# Patient Record
Sex: FEMALE | Race: WHITE | Employment: UNEMPLOYED | URBAN - METROPOLITAN AREA
[De-identification: names, ages, dates, MRNs, and addresses within clinical notes are randomized per-mention and may not be internally consistent; named-entity substitution may affect disease eponyms.]

---

## 2017-05-31 ENCOUNTER — TELEPHONE (OUTPATIENT)
Dept: ENT CLINIC | Age: 47
End: 2017-05-31

## 2017-06-16 ENCOUNTER — TELEPHONE (OUTPATIENT)
Dept: ENT CLINIC | Age: 47
End: 2017-06-16

## 2021-11-24 ENCOUNTER — HOSPITAL ENCOUNTER (EMERGENCY)
Age: 51
Discharge: HOME | End: 2021-11-24
Attending: EMERGENCY MEDICINE
Payer: COMMERCIAL

## 2021-11-24 VITALS
DIASTOLIC BLOOD PRESSURE: 60 MMHG | TEMPERATURE: 97.7 F | OXYGEN SATURATION: 99 % | HEART RATE: 90 BPM | SYSTOLIC BLOOD PRESSURE: 115 MMHG | RESPIRATION RATE: 18 BRPM

## 2021-11-24 DIAGNOSIS — R21 RASH AND OTHER NONSPECIFIC SKIN ERUPTION: Primary | ICD-10-CM

## 2021-11-24 DIAGNOSIS — B37.0 THRUSH, ORAL: ICD-10-CM

## 2021-11-24 PROCEDURE — 99284 EMERGENCY DEPT VISIT MOD MDM: CPT

## 2021-11-24 PROCEDURE — 6370000000 HC RX 637 (ALT 250 FOR IP): Performed by: EMERGENCY MEDICINE

## 2021-11-24 RX ORDER — HYDROXYZINE PAMOATE 25 MG/1
50 CAPSULE ORAL 3 TIMES DAILY PRN
Status: DISCONTINUED | OUTPATIENT
Start: 2021-11-24 | End: 2021-11-24 | Stop reason: HOSPADM

## 2021-11-24 RX ORDER — HYDROXYZINE PAMOATE 25 MG/1
50 CAPSULE ORAL 3 TIMES DAILY PRN
Qty: 30 CAPSULE | Refills: 0 | Status: SHIPPED | OUTPATIENT
Start: 2021-11-24 | End: 2021-11-29

## 2021-11-24 RX ADMIN — HYDROXYZINE PAMOATE 50 MG: 25 CAPSULE ORAL at 19:42

## 2021-11-24 RX ADMIN — NYSTATIN 500000 UNITS: 100000 SUSPENSION ORAL at 19:45

## 2021-11-25 NOTE — ED PROVIDER NOTES
[facility] 71 ENCOUNTER          ATTENDING PHYSICIAN NOTE       Date of evaluation: 11/24/2021    Chief Complaint     Rash (pt had knee replacement surgery last Thursday, states she thinks she is having an allergic reaction to something, she states that she had a rash all bret her body since the day she left the hospital. pt states her daughter thinks it is scabies, states her boyfriend thinks it is from her percocets.)      History of Present Illness     Ran Smith is a 45 y.o. female who presents with complaint of itching and rash for the last 6 days. She says that it started after she had a knee replacement last week, and almost immediately afterward felt itching on her skin. She complains of itching on her abdomen, upper chest, and on the back. Nothing on the webspaces of her hands. She does have some of the lower legs. None in the intertriginous areas. She notes that the excoriated areas are tender once they become excoriated, but before that are just itchy. She also complains of development of thrush which she says is common for her when she uses inhalers or antibiotics. She describes a raw feeling to her tongue and mouth. She has a picture of discharge on her tongue denies shortness of breath or chest pain. Says she has not taken anything for either the thrush or for the itchiness. Review of Systems     Review of Systems  Pertinent positives and negatives are listed in the HPI; otherwise all systems are reviewed and were negative. Past Medical, Surgical, Family, and Social History     She has a past medical history of Solares's esophageal ulceration. She has a past surgical history that includes Hysterectomy; Tubal ligation; and Tonsillectomy. Her family history is not on file. She reports that she has been smoking cigarettes. She has a 12.50 pack-year smoking history.  She has never used smokeless tobacco. She reports that she does not drink alcohol and does not use drugs. Medications     Discharge Medication List as of 11/24/2021  7:46 PM      CONTINUE these medications which have NOT CHANGED    Details   ziprasidone (GEODON) 20 MG capsule Take 20 mg by mouth daily      omeprazole (PRILOSEC) 20 MG capsule Take 40 mg by mouth daily             Allergies     She is allergic to penicillins and sulfa antibiotics. Physical Exam     INITIAL VITALS: BP: 115/65, Temp: 97.7 °F (36.5 °C), Pulse: 93, Resp: 19, SpO2: 97 %   Physical Exam  Constitutional:       Appearance: Normal appearance. HENT:      Head: Normocephalic and atraumatic. Mouth/Throat:      Mouth: Mucous membranes are moist.      Comments: Erythema of tongue with some associated tenderness and no swelling. Posterior pharynx mildly erythematous. Cardiovascular:      Rate and Rhythm: Normal rate and regular rhythm. Musculoskeletal:         General: No swelling. Skin:     Comments: Multiple small discrete excoriated areas on the skin of the abdomen and exposed areas of upper chest.  A couple of other similar lesions are noted on the back. , without obvious erythematous lesions otherwise. No sign of secondary infection. No nonexcoriated lesions are noted. No lesions on the hands soles, no webspace lesions   Neurological:      General: No focal deficit present. Mental Status: She is alert and oriented to person, place, and time. Diagnostic Results     EKG       RADIOLOGY:  No orders to display       LABS:   No results found for this visit on 11/24/21. ED BEDSIDE ULTRASOUND:      RECENT VITALS:  BP: 115/60,Temp: 97.7 °F (36.5 °C), Pulse: 90, Resp: 18, SpO2: 99 %     Procedures         ED Course     Nursing Notes, Past Medical Hx, Past Surgical Hx, Social Hx,Allergies, and Family Hx were reviewed.     patient was given the following medications:  Orders Placed This Encounter   Medications    nystatin (MYCOSTATIN) 843038 UNIT/ML suspension 500,000 Units    DISCONTD: hydrOXYzine (VISTARIL) capsule 50 mg    nystatin (MYCOSTATIN) [id number] UNIT/ML suspension     Sig: Take 5 mLs by mouth 4 times daily for 10 days Retain in mouth as long as possible     Dispense:  200 mL     Refill:  0    hydrOXYzine (VISTARIL) 25 MG capsule     Sig: Take 2 capsules by mouth 3 times daily as needed for Itching     Dispense:  30 capsule     Refill:  0       CONSULTS:  None    MEDICAL DECISIONMAKING / ASSESSMENT / Ambrose Kay is a 45 y.o. female who presents with pruritus and oropharyngeal thrush. While the patient does have any obvious white exudate now, she does have pictures from previous days which do appear consistent with an oropharyngeal thrush. Given her report that she has had this multiple times before we will go ahead and prescribe some nystatin oral solution. Regarding her complaint of itch, I do not appreciate any obvious lesions other than to be excoriated very small discrete lesion on the exposed areas of the abdomen and back. These may be related to a general pruritus having to do screening washes with chlorhexidine or from some other irritant, but they do not appear particularly patterned and I do not see any evidence of a drug eruption. This does not appear consistent with TEN/SJS-this appears to be thrush along with some skin itching with excoriations of the skin. Will recommend/prescribe Vistaril for itching. She was given first dose of both medications here. We will have her follow-up with her primary care doctor. Clinical Impression     1. Rash and other nonspecific skin eruption    2.  Thrush, oral        Disposition     PATIENT REFERRED TO:  Marimar Adair MD  [address] [facility] [id number] [place] [address]    In 3 days        DISCHARGE MEDICATIONS:  Discharge Medication List as of 11/24/2021  7:46 PM      START taking these medications    Details   nystatin (MYCOSTATIN) [id number] UNIT/ML suspension Take 5 mLs by mouth 4 times daily for 10 days Retain

## 2021-12-28 ENCOUNTER — TELEPHONE (OUTPATIENT)
Dept: UROGYNECOLOGY | Age: 51
End: 2021-12-28

## 2021-12-28 ENCOUNTER — OFFICE VISIT (OUTPATIENT)
Dept: UROGYNECOLOGY | Age: 51
End: 2021-12-28
Payer: COMMERCIAL

## 2021-12-28 VITALS
DIASTOLIC BLOOD PRESSURE: 100 MMHG | OXYGEN SATURATION: 97 % | TEMPERATURE: 98 F | HEART RATE: 68 BPM | SYSTOLIC BLOOD PRESSURE: 116 MMHG | RESPIRATION RATE: 14 BRPM

## 2021-12-28 DIAGNOSIS — N81.6 RECTOCELE: ICD-10-CM

## 2021-12-28 DIAGNOSIS — N81.5 VAGINAL ENTEROCELE: ICD-10-CM

## 2021-12-28 DIAGNOSIS — N81.9 VAGINAL VAULT PROLAPSE: ICD-10-CM

## 2021-12-28 DIAGNOSIS — N39.3 STRESS INCONTINENCE: ICD-10-CM

## 2021-12-28 DIAGNOSIS — R39.15 URINARY URGENCY: ICD-10-CM

## 2021-12-28 DIAGNOSIS — N81.11 CYSTOCELE, MIDLINE: ICD-10-CM

## 2021-12-28 DIAGNOSIS — N39.41 URGE INCONTINENCE: ICD-10-CM

## 2021-12-28 DIAGNOSIS — R35.0 URINARY FREQUENCY: Primary | ICD-10-CM

## 2021-12-28 LAB
BILIRUBIN, POC: NORMAL
BLOOD URINE, POC: NORMAL
CLARITY, POC: CLEAR
COLOR, POC: YELLOW
EMPTY COUGH STRESS TEST: NEGATIVE
FIRST SENSATION: 180 CC
FULL COUGH STRESS TEST: NEGATIVE
GLUCOSE URINE, POC: NORMAL
KETONES, POC: NORMAL
LEUKOCYTE EST, POC: NORMAL
MAX SENSATION: 350 CC
NITRATE, URINE POC: NORMAL
NITRITE, POC: NORMAL
PH, POC: 6.5
POST VOID RESIDUAL (PVR): 210 ML
PROTEIN, POC: NORMAL
RBC URINE, POC: NORMAL
SECOND SENSATION: 210 CC
SPASM: NEGATIVE
SPECIFIC GRAVITY, POC: 1.01
UROBILINOGEN, POC: NORMAL
WBC URINE, POC: NORMAL

## 2021-12-28 PROCEDURE — G8427 DOCREV CUR MEDS BY ELIG CLIN: HCPCS | Performed by: OBSTETRICS & GYNECOLOGY

## 2021-12-28 PROCEDURE — 51725 SIMPLE CYSTOMETROGRAM: CPT | Performed by: OBSTETRICS & GYNECOLOGY

## 2021-12-28 PROCEDURE — 99244 OFF/OP CNSLTJ NEW/EST MOD 40: CPT | Performed by: OBSTETRICS & GYNECOLOGY

## 2021-12-28 PROCEDURE — 81002 URINALYSIS NONAUTO W/O SCOPE: CPT | Performed by: OBSTETRICS & GYNECOLOGY

## 2021-12-28 PROCEDURE — G8421 BMI NOT CALCULATED: HCPCS | Performed by: OBSTETRICS & GYNECOLOGY

## 2021-12-28 PROCEDURE — G8484 FLU IMMUNIZE NO ADMIN: HCPCS | Performed by: OBSTETRICS & GYNECOLOGY

## 2021-12-28 RX ORDER — NORTRIPTYLINE HYDROCHLORIDE 25 MG/1
25 CAPSULE ORAL NIGHTLY
Status: ON HOLD | COMMUNITY
End: 2022-05-02

## 2021-12-28 RX ORDER — MONTELUKAST SODIUM 10 MG/1
10 TABLET ORAL NIGHTLY
COMMUNITY

## 2021-12-28 RX ORDER — LORATADINE 10 MG/1
10 CAPSULE, LIQUID FILLED ORAL DAILY
COMMUNITY

## 2021-12-28 RX ORDER — TRAZODONE HYDROCHLORIDE 100 MG/1
100 TABLET ORAL PRN
Status: ON HOLD | COMMUNITY
End: 2022-05-02

## 2021-12-28 RX ORDER — BUSPIRONE HYDROCHLORIDE 15 MG/1
15 TABLET ORAL 2 TIMES DAILY
COMMUNITY

## 2021-12-28 RX ORDER — TOPIRAMATE 100 MG/1
100 TABLET, FILM COATED ORAL 2 TIMES DAILY
Status: ON HOLD | COMMUNITY
End: 2022-05-02

## 2021-12-28 RX ORDER — LITHIUM CARBONATE 450 MG
450 TABLET, EXTENDED RELEASE ORAL 2 TIMES DAILY
COMMUNITY

## 2021-12-28 ASSESSMENT — ENCOUNTER SYMPTOMS
WHEEZING: 1
COUGH: 1
BACK PAIN: 1
SINUS PRESSURE: 1
SINUS PAIN: 1
SHORTNESS OF BREATH: 1
RHINORRHEA: 1
SORE THROAT: 1

## 2021-12-28 NOTE — PROGRESS NOTES
12/28/2021      HPI:     Name: Handy Freedman  YOB: 1970    CC: Patient is a 45 y.o. female who is seen in consultation from Lakisha Solomon MD,  for evaluation of prolapse. HPI:  She has had a previous hysterectomy and has prolapse that extends outside the opening of the vagina. She also complains of urinary incontinence with urinary urgency frequency and stress urinary incontinence. She also has bad irritable bowel symptoms with diarrhea and constipation. Bladder control problem: Yes   How many months have you had a bladder problem? 35+ years  Do you use pads to absorb lost urine? No.  How many trips do you make to the bathroom during the day? 6-10  How many times do you wake at night to go to the bathroom? 3+  Do you ever wet the bed while asleep? No  Are there times when you cannot make it to the bathroom on time? No  Does sound, sight, or feel of running water cause you to lose urine? No  How many glasses of liquid do you consume daily? 120-200  How many drinks containing caffeine do you consume daily? 8-10  Which best describes urine loss: (Check all that apply)   [x] I lose urine during coughing, sneezing, running, lifting   [] I lose urine with changes in posture, standing, walking   [] I lose urine continuously such that I am constantly wet   [] I have sudden, urgent needs without the ability to make it to the bathroom  Have you seen a physician for complaints of urine loss? No  Have you taken medication to prevent urine loss? No     Bladder emptying problems:  Yes   How long have you had bladder emptying problems? 35+ years  Do you notice any dribbling of urine when you stand after passing urine? Yes  Do you usually have difficulty starting your urine stream? Yes  Do you have to assume abnormal positions to urinate? No   Do you have to strain to empty your bladder? Yes  Is your urine flow: intermittent  Do you feel as if your bladder is empty after passing urine? Allergen Reactions    Penicillins     Sulfa Antibiotics      Current Medications:  Current Outpatient Medications   Medication Sig Dispense Refill    busPIRone (BUSPAR) 15 MG tablet Take 15 mg by mouth 2 times daily      lithium (ESKALITH) 450 MG extended release tablet Take 450 mg by mouth 2 times daily      montelukast (SINGULAIR) 10 MG tablet Take 10 mg by mouth nightly      topiramate (TOPAMAX) 100 MG tablet Take 100 mg by mouth 2 times daily      traZODone (DESYREL) 100 MG tablet Take 100 mg by mouth nightly      loratadine (CLARITIN) 10 MG capsule Take 10 mg by mouth daily      nortriptyline (PAMELOR) 25 MG capsule Take 25 mg by mouth nightly      omeprazole (PRILOSEC) 20 MG capsule Take 40 mg by mouth daily      ziprasidone (GEODON) 20 MG capsule Take 20 mg by mouth daily (Patient not taking: Reported on 12/28/2021)       No current facility-administered medications for this visit. Social History:   Social History     Socioeconomic History    Marital status:      Spouse name: Not on file    Number of children: 11    Years of education: Not on file    Highest education level: Not on file   Occupational History    Occupation: nursing asst ,    Tobacco Use    Smoking status: Current Every Day Smoker     Packs/day: 0.50     Years: 25.00     Pack years: 12.50     Types: Cigarettes    Smokeless tobacco: Never Used   Substance and Sexual Activity    Alcohol use: No    Drug use: No    Sexual activity: Not Currently     Partners: Male   Other Topics Concern    Not on file   Social History Narrative    Not on file     Social Determinants of Health     Financial Resource Strain:     Difficulty of Paying Living Expenses: Not on file   Food Insecurity:     Worried About Running Out of Food in the Last Year: Not on file    Neetu of Food in the Last Year: Not on file   Transportation Needs:     Lack of Transportation (Medical):  Not on file    Lack of Transportation (Non-Medical): Not on file   Physical Activity:     Days of Exercise per Week: Not on file    Minutes of Exercise per Session: Not on file   Stress:     Feeling of Stress : Not on file   Social Connections:     Frequency of Communication with Friends and Family: Not on file    Frequency of Social Gatherings with Friends and Family: Not on file    Attends Taoist Services: Not on file    Active Member of [address] [place] [address] or Organizations: Not on file    Attends Club or Organization Meetings: Not on file    Marital Status: Not on file   Intimate Partner Violence:     Fear of Current or Ex-Partner: Not on file    Emotionally Abused: Not on file    Physically Abused: Not on file    Sexually Abused: Not on file   Housing Stability:     Unable to Pay for Housing in the Last Year: Not on file    Number of [place] in the Last Year: Not on file    Unstable Housing in the Last Year: Not on file     Family History: History reviewed. No pertinent family history. Review of System  Review of Systems   HENT: Positive for congestion, ear pain, rhinorrhea, sinus pressure, sinus pain, sneezing and sore throat. Respiratory: Positive for cough, shortness of breath and wheezing. Genitourinary: Positive for frequency. Musculoskeletal: Positive for arthralgias, back pain and myalgias. Neurological: Positive for light-headedness and headaches. Psychiatric/Behavioral: Positive for confusion and decreased concentration. The patient is nervous/anxious. All other systems reviewed and are negative. A review of systems was done by the patient and reviewed by the provider. Objective:     Vital Signs  Vitals:    12/28/21 1027   BP: (!) 116/100   Pulse: 68   Resp: 14   Temp: 98 °F (36.7 °C)   SpO2: 97%     Physical Exam  Physical Exam  HENT:      Head: Normocephalic and atraumatic.    Eyes:      Conjunctiva/sclera: Conjunctivae normal.   Pulmonary:      Effort: Pulmonary effort is normal.   Abdominal:      Palpations: Abdomen is soft.   Genitourinary:     Comments: Cystocele rectocele, enterocele  Musculoskeletal:      Cervical back: Normal range of motion and neck supple. Skin:     General: Skin is warm and dry. Neurological:      Mental Status: She is alert and oriented to person, place, and time. Office Fill Study/Urine Dip:     Using sterile technique a manometry catheter was placed. Patient's bladder was filled with sterile water by gravity. Capacity and storage volumes were measured. Spasms assessed. Catheter was removed. Stress urinary incontinence was assessed.     Results for POC orders placed in visit on 12/28/21   POCT Urinalysis no Micro   Result Value Ref Range    Color, UA yellow     Clarity, UA clear     Glucose, UA POC neg     Bilirubin, UA neg     Ketones, UA neg     Spec Grav, UA 1.015     Blood, UA POC neg     pH, UA 6.5     Protein, UA POC neg     Urobilinogen, UA neg     Leukocytes, UA neg     Nitrite, UA neg        Cystometrogram   wbc urine, poc   Date Value Ref Range Status   12/28/2021 neg  Final     rbc urine, poc   Date Value Ref Range Status   12/28/2021 neg  Final     Nitrate, UA POC   Date Value Ref Range Status   12/28/2021 neg  Final     post void residual   Date Value Ref Range Status   12/28/2021 210 ml Final     FIRST SENSATION   Date Value Ref Range Status   12/28/2021 180 cc Final     SECOND SENSATION   Date Value Ref Range Status   12/28/2021 210 cc Final     MAX SENSATION   Date Value Ref Range Status   12/28/2021 350 cc Final     EMPTY COUGH STRESS TEST   Date Value Ref Range Status   12/28/2021 negative  Final     FULL COUGH STRESS TEST   Date Value Ref Range Status   12/28/2021 negative  Final     SPASM   Date Value Ref Range Status   12/28/2021 negative  Final        POPQ and Pelvic Exam:     Pelvic Organ Prolapse Quantification  Anterior Wall (Aa): -1   Anterior Wall (Ba): -1   Cervix or Cuff (C): -5     Genital Hiatus (gh): 3   Perineal Body (pb): 2   Total Vaginal Length (tvl): 8     Posterior Wall (Ap): +1   Posterior Wall (Bp): +1   No data recorded   No data recorded     Assessment/Plan:     Beckie Busby is a 45 y.o. female with   1. Urinary frequency    2. Urinary urgency    3. Stress incontinence    4. Urge incontinence    5. Cystocele, midline    6. Vaginal enterocele    7. Vaginal vault prolapse    8. Rectocele    Old records reviewed, outside records reviewed, cystometrogram was reviewed    The patient describes urinary leakage however we were unable to demonstrate any today. With her back history and radiculopathy she would be a good candidate for overactive bladder symptoms however she also states that she leaks significantly with coughing. We will reassess this when she returns for her cystourethroscopy. We also have her do urodynamics testing. For her pelvic organ prolapse she has a predominant cystocele and rectocele today. She does however show me photo from her phone that shows a much larger prolapse. I did talk with her about both sacrocolpopexy and vaginal repairs. I told her that we would repeat her exam when she comes in and choose a route of repair at that time. She was given handouts on her condition and a picture was drawn. Orders Placed This Encounter   Procedures    POCT Urinalysis no Micro    Cystometrogram     No orders of the defined types were placed in this encounter.       Michelle Forbes MD

## 2022-02-08 ENCOUNTER — PROCEDURE VISIT (OUTPATIENT)
Dept: UROGYNECOLOGY | Age: 52
End: 2022-02-08
Payer: COMMERCIAL

## 2022-02-08 VITALS
DIASTOLIC BLOOD PRESSURE: 84 MMHG | OXYGEN SATURATION: 98 % | HEART RATE: 78 BPM | RESPIRATION RATE: 14 BRPM | TEMPERATURE: 98 F | SYSTOLIC BLOOD PRESSURE: 134 MMHG

## 2022-02-08 DIAGNOSIS — N81.5 VAGINAL ENTEROCELE: ICD-10-CM

## 2022-02-08 DIAGNOSIS — N39.3 STRESS INCONTINENCE: ICD-10-CM

## 2022-02-08 DIAGNOSIS — N81.6 RECTOCELE: ICD-10-CM

## 2022-02-08 DIAGNOSIS — N81.9 VAGINAL VAULT PROLAPSE: ICD-10-CM

## 2022-02-08 DIAGNOSIS — N81.11 CYSTOCELE, MIDLINE: ICD-10-CM

## 2022-02-08 DIAGNOSIS — R35.0 URINARY FREQUENCY: Primary | ICD-10-CM

## 2022-02-08 DIAGNOSIS — R39.15 URINARY URGENCY: ICD-10-CM

## 2022-02-08 PROCEDURE — G8484 FLU IMMUNIZE NO ADMIN: HCPCS | Performed by: OBSTETRICS & GYNECOLOGY

## 2022-02-08 PROCEDURE — 52285 CYSTOSCOPY AND TREATMENT: CPT | Performed by: OBSTETRICS & GYNECOLOGY

## 2022-02-08 PROCEDURE — 4004F PT TOBACCO SCREEN RCVD TLK: CPT | Performed by: OBSTETRICS & GYNECOLOGY

## 2022-02-08 PROCEDURE — G8421 BMI NOT CALCULATED: HCPCS | Performed by: OBSTETRICS & GYNECOLOGY

## 2022-02-08 PROCEDURE — 99214 OFFICE O/P EST MOD 30 MIN: CPT | Performed by: OBSTETRICS & GYNECOLOGY

## 2022-02-08 PROCEDURE — G8427 DOCREV CUR MEDS BY ELIG CLIN: HCPCS | Performed by: OBSTETRICS & GYNECOLOGY

## 2022-02-08 PROCEDURE — 3017F COLORECTAL CA SCREEN DOC REV: CPT | Performed by: OBSTETRICS & GYNECOLOGY

## 2022-02-08 RX ORDER — SODIUM CHLORIDE 9 MG/ML
25 INJECTION, SOLUTION INTRAVENOUS PRN
Status: CANCELLED | OUTPATIENT
Start: 2022-02-08

## 2022-02-08 RX ORDER — SODIUM CHLORIDE 0.9 % (FLUSH) 0.9 %
5-40 SYRINGE (ML) INJECTION PRN
Status: CANCELLED | OUTPATIENT
Start: 2022-02-08

## 2022-02-08 RX ORDER — SODIUM CHLORIDE 0.9 % (FLUSH) 0.9 %
5-40 SYRINGE (ML) INJECTION EVERY 12 HOURS SCHEDULED
Status: CANCELLED | OUTPATIENT
Start: 2022-02-08

## 2022-02-08 NOTE — PROGRESS NOTES
2022     HPI:     Name: Ezequiel Carrera  YOB: 1970    CC: Ezequiel Carrera is a 45 y.o. female presenting for an evaluation of prolapse. HPI: How long have you had this problem? Please rate the severity of your problem: moderate  Anything make it better? No changes since last visit. Ob/Gyn History:    OB History    Para Term  AB Living   5 5 5     5   SAB IAB Ectopic Molar Multiple Live Births             5      # Outcome Date GA Lbr Dionte/2nd Weight Sex Delivery Anes PTL Lv   5 Term      Vag-Spont   STEVEN   4 Term      Vag-Spont   STEVEN   3 Term      Vag-Spont   STEVEN   2 Term      Vag-Spont   STEVEN   1 Term      Vag-Spont   STEVEN     Past Medical History:   Past Medical History:   Diagnosis Date    Solares's esophageal ulceration      Past Surgical History:   Past Surgical History:   Procedure Laterality Date    HYSTERECTOMY      TONSILLECTOMY      TUBAL LIGATION       Current Medications:  Current Outpatient Medications   Medication Sig Dispense Refill    busPIRone (BUSPAR) 15 MG tablet Take 15 mg by mouth 2 times daily      lithium (ESKALITH) 450 MG extended release tablet Take 450 mg by mouth 2 times daily      montelukast (SINGULAIR) 10 MG tablet Take 10 mg by mouth nightly      topiramate (TOPAMAX) 100 MG tablet Take 100 mg by mouth 2 times daily      traZODone (DESYREL) 100 MG tablet Take 100 mg by mouth nightly      loratadine (CLARITIN) 10 MG capsule Take 10 mg by mouth daily      nortriptyline (PAMELOR) 25 MG capsule Take 25 mg by mouth nightly      ziprasidone (GEODON) 20 MG capsule Take 20 mg by mouth daily       omeprazole (PRILOSEC) 20 MG capsule Take 40 mg by mouth daily       No current facility-administered medications for this visit. Allergies:    Allergies   Allergen Reactions    Penicillins     Sulfa Antibiotics      Social History:   Social History     Socioeconomic History    Marital status:      Spouse name: Not on file    Number of children: 5    Years of education: Not on file    Highest education level: Not on file   Occupational History    Occupation: nursing asst ,    Tobacco Use    Smoking status: Current Every Day Smoker     Packs/day: 0.50     Years: 25.00     Pack years: 12.50     Types: Cigarettes    Smokeless tobacco: Never Used   Substance and Sexual Activity    Alcohol use: No    Drug use: No    Sexual activity: Not Currently     Partners: Male   Other Topics Concern    Not on file   Social History Narrative    Not on file     Social Determinants of Health     Financial Resource Strain:     Difficulty of Paying Living Expenses: Not on file   Food Insecurity:     Worried About Running Out of Food in the Last Year: Not on file    Neetu of Food in the Last Year: Not on file   Transportation Needs:     Lack of Transportation (Medical): Not on file    Lack of Transportation (Non-Medical): Not on file   Physical Activity:     Days of Exercise per Week: Not on file    Minutes of Exercise per Session: Not on file   Stress:     Feeling of Stress : Not on file   Social Connections:     Frequency of Communication with Friends and Family: Not on file    Frequency of Social Gatherings with Friends and Family: Not on file    Attends Gnosticist Services: Not on file    Active Member of [address] [place] [address] [employer] or Organizations: Not on file    Attends Club or Organization Meetings: Not on file    Marital Status: Not on file   Intimate Partner Violence:     Fear of Current or Ex-Partner: Not on file    Emotionally Abused: Not on file    Physically Abused: Not on file    Sexually Abused: Not on file   Housing Stability:     Unable to Pay for Housing in the Last Year: Not on file    Number of [place] in the Last Year: Not on file    Unstable Housing in the Last Year: Not on file     Family History: History reviewed. No pertinent family history.   Review of System  Review of Systems   All other systems reviewed and are negative. A review of systems was done by the patient and reviewed by me. Objective:     Vital Signs  Vitals:    02/08/22 1419   BP: 134/84   Pulse: 78   Resp: 14   Temp: 98 °F (36.7 °C)   SpO2: 98%      Physical Exam  Physical Exam  HENT:      Head: Normocephalic and atraumatic. Eyes:      Conjunctiva/sclera: Conjunctivae normal.   Pulmonary:      Effort: Pulmonary effort is normal.   Abdominal:      Palpations: Abdomen is soft. Genitourinary:     Comments: Large rectocele enterocele vault prolapse  Musculoskeletal:      Cervical back: Normal range of motion and neck supple. Skin:     General: Skin is warm and dry. Neurological:      Mental Status: She is alert and oriented to person, place, and time. Procedure: The urethral was anesthesized with topical lidocaine jelly and dilated to a #14 Khmer. A 0-degree urethroscope was used to examine the urethra. A 70-degree cystoscope was used to evaluate the bladder. FINDINGS:  1. Urethra was normal  2. Bladder had normal  3. Trigone appeared Normal  4. Ureters illustrated bilateral efflux  5. Patient exhibited spasms during the study no    Cough stress test: Bladder filled to 250 mL. Patient did leak with cough stress test.    No results found for this visit on 02/08/22. Assessment/Plan:     Sigifredo Torres is a 45 y.o. female with   1. Urinary frequency    2. Urinary urgency    3. Stress incontinence    4. Cystocele, midline    5. Vaginal enterocele    6. Vaginal vault prolapse    7. Rectocele    Old records reviewed, outside records reviewed, cystourethroscopy was reviewed    I reexamined her today and the predominance of her prolapse is a rectocele and enterocele. Because of this we will approach this vaginally. She also needs a urodynamics test for urinary leakage although she did leak with a cough today. The patient was counseled on surgical and non-surgical options. The patient elected to move forward with surgery.   The risks and benefits of surgery including but not limited to bleeding, infection, injury to bowel, bladder, ureter, or other internal organs, transfusion, pain, bowel dysfunction, urinary incontinence, and sexual dysfunction were discussed at length. All questions were answered to the patients satisfaction. The patient elected to undergo anterior repair, posterior repair, vaginal vault suspension, synthetic mid-urethral sling and cystourethroscopy. The risk and benefits of synthetic material, including mesh, were explained to the patient and all questions were answered. Preop orders were placed  Orders Placed This Encounter   Procedures    Initiate PAT Protocol     Standing Status:   Future     Standing Expiration Date:   4/9/2022    MS CYSTOSCOPY,RX FEMALE URETHRAL SYND     No orders of the defined types were placed in this encounter.       Maddie Escamilla MD

## 2022-02-08 NOTE — LETTER
[facility]  [address] [place] [address]  Phone: [phone]  Fax: [phone]    Leticia Howard MD    February 8, 2022     Cinthya Blanchard MD  [facility]    Patient: Neo Alejandro   MR Number: [id number]   YOB: 1970   Date of Visit: 2/8/2022       Dear Cinthya Blanchard:    Thank you for referring Corine Shea to me for evaluation/treatment. Below are the relevant portions of my assessment and plan of care. If you have questions, please do not hesitate to call me. I look forward to following Angela Blair along with you.     Sincerely,      Leticia Howard MD

## 2022-02-14 PROBLEM — J45.40 MODERATE PERSISTENT ASTHMA: Status: ACTIVE | Noted: 2017-07-17

## 2022-02-14 PROBLEM — J31.0 CHRONIC RHINITIS: Status: ACTIVE | Noted: 2020-10-13

## 2022-02-14 PROBLEM — G47.33 OSA (OBSTRUCTIVE SLEEP APNEA): Status: ACTIVE | Noted: 2018-10-09

## 2022-02-14 PROBLEM — M75.101 ROTATOR CUFF SYNDROME, RIGHT: Status: ACTIVE | Noted: 2019-08-06

## 2022-02-14 PROBLEM — D72.829 LEUCOCYTOSIS: Status: ACTIVE | Noted: 2019-02-06

## 2022-02-14 PROBLEM — R15.9 BOWEL INCONTINENCE: Status: ACTIVE | Noted: 2017-05-24

## 2022-02-14 PROBLEM — M17.12 PRIMARY OSTEOARTHRITIS OF LEFT KNEE: Status: ACTIVE | Noted: 2018-10-12

## 2022-02-14 PROBLEM — I10 ESSENTIAL HYPERTENSION: Status: ACTIVE | Noted: 2017-05-24

## 2022-02-14 PROBLEM — J20.8 ACUTE BRONCHITIS DUE TO OTHER SPECIFIED ORGANISMS: Status: ACTIVE | Noted: 2020-12-03

## 2022-02-14 PROBLEM — E78.5 HYPERLIPIDEMIA: Status: ACTIVE | Noted: 2017-05-24

## 2022-02-14 PROBLEM — F17.201 TOBACCO ABUSE, IN REMISSION: Status: ACTIVE | Noted: 2018-10-09

## 2022-02-14 PROBLEM — T78.40XA ALLERGY: Status: ACTIVE | Noted: 2022-02-14

## 2022-02-22 DIAGNOSIS — Z20.822 ENCOUNTER FOR PREOPERATIVE SCREENING LABORATORY TESTING FOR COVID-19 VIRUS: Primary | ICD-10-CM

## 2022-02-22 DIAGNOSIS — Z01.812 ENCOUNTER FOR PREOPERATIVE SCREENING LABORATORY TESTING FOR COVID-19 VIRUS: Primary | ICD-10-CM

## 2022-03-24 ENCOUNTER — TELEPHONE (OUTPATIENT)
Dept: UROGYNECOLOGY | Age: 52
End: 2022-03-24

## 2022-03-24 NOTE — TELEPHONE ENCOUNTER
Patient called the office to say she just was diagnosed with a UTI after taking a bubble bath. She was prescribed Pyridium and Macrobid which she is taking. She states she feels much better but she wants to make sure she can still have the Urodynamics test on 3/28. I asked Manuela Cooper NP who stated yes she can keep the appt. Patient verbalized understanding. details… Affect and characteristics of appearance, verbalizations, behaviors are appropriate

## 2022-03-28 ENCOUNTER — PROCEDURE VISIT (OUTPATIENT)
Dept: UROGYNECOLOGY | Age: 52
End: 2022-03-28
Payer: COMMERCIAL

## 2022-03-28 VITALS
RESPIRATION RATE: 18 BRPM | HEART RATE: 88 BPM | DIASTOLIC BLOOD PRESSURE: 82 MMHG | TEMPERATURE: 97.9 F | OXYGEN SATURATION: 97 % | SYSTOLIC BLOOD PRESSURE: 116 MMHG

## 2022-03-28 DIAGNOSIS — N39.3 STRESS INCONTINENCE: ICD-10-CM

## 2022-03-28 DIAGNOSIS — R35.0 URINARY FREQUENCY: Primary | ICD-10-CM

## 2022-03-28 LAB
BILIRUBIN, POC: NORMAL
BLOOD URINE, POC: NORMAL
CLARITY, POC: CLEAR
COLOR, POC: YELLOW
GLUCOSE URINE, POC: NORMAL
KETONES, POC: NORMAL
LEUKOCYTE EST, POC: NORMAL
NITRITE, POC: NORMAL
PH, POC: 6.5
PROTEIN, POC: NORMAL
SPECIFIC GRAVITY, POC: 1.01
UROBILINOGEN, POC: NORMAL

## 2022-03-28 PROCEDURE — 51797 INTRAABDOMINAL PRESSURE TEST: CPT | Performed by: OBSTETRICS & GYNECOLOGY

## 2022-03-28 PROCEDURE — 51784 ANAL/URINARY MUSCLE STUDY: CPT | Performed by: OBSTETRICS & GYNECOLOGY

## 2022-03-28 PROCEDURE — 51729 CYSTOMETROGRAM W/VP&UP: CPT | Performed by: OBSTETRICS & GYNECOLOGY

## 2022-03-28 PROCEDURE — 51741 ELECTRO-UROFLOWMETRY FIRST: CPT | Performed by: OBSTETRICS & GYNECOLOGY

## 2022-03-28 PROCEDURE — 81002 URINALYSIS NONAUTO W/O SCOPE: CPT | Performed by: OBSTETRICS & GYNECOLOGY

## 2022-03-28 RX ORDER — LORATADINE 10 MG/1
TABLET ORAL
COMMUNITY
Start: 2022-03-03 | End: 2022-03-28

## 2022-03-28 RX ORDER — PHENAZOPYRIDINE HYDROCHLORIDE 200 MG/1
200 TABLET, FILM COATED ORAL 3 TIMES DAILY PRN
COMMUNITY
Start: 2022-03-23 | End: 2022-04-19

## 2022-03-28 RX ORDER — NITROFURANTOIN 25; 75 MG/1; MG/1
CAPSULE ORAL
COMMUNITY
Start: 2022-03-23 | End: 2022-04-19

## 2022-03-28 NOTE — PROGRESS NOTES
Urodynamic Procedure Note    Christie Rousseau  1970    Urodynamicist: Dr. Osmani Jessica    Equipment: [employer]    Brief History/Indication:    Patient is a 45 y.o. female with subjective complaints of prolapse and urinary frequency for a urodynamic evaluation. Cystometrogram   wbc urine, poc   Date Value Ref Range Status   12/28/2021 neg  Final     rbc urine, poc   Date Value Ref Range Status   12/28/2021 neg  Final     Nitrate, UA POC   Date Value Ref Range Status   12/28/2021 neg  Final     post void residual   Date Value Ref Range Status   12/28/2021 210 ml Final     FIRST SENSATION   Date Value Ref Range Status   12/28/2021 180 cc Final     SECOND SENSATION   Date Value Ref Range Status   12/28/2021 210 cc Final     MAX SENSATION   Date Value Ref Range Status   12/28/2021 350 cc Final     EMPTY COUGH STRESS TEST   Date Value Ref Range Status   12/28/2021 negative  Final     FULL COUGH STRESS TEST   Date Value Ref Range Status   12/28/2021 negative  Final     SPASM   Date Value Ref Range Status   12/28/2021 negative  Final       Pelvic Organ Prolapse Quantification  Anterior Wall (Aa): -1   Anterior Wall (Ba): -1   Cervix or Cuff (C): -5     Genital Hiatus (gh): 3   Perineal Body (pb): 2   Total Vaginal Length (tvl): 8     Posterior Wall (Ap): +1   Posterior Wall (Bp): +1   No data recorded   No data recorded    Review of Systems   All other systems reviewed and are negative. Procedure: The Patient was taken to the Urodynamics suite and a free urine flow was obtained followed by catheterization for residual urine. A double-lumen urodynamic catheter was introduced to the bladder for measuring bladder pressure, and for filling. EMG patch electrodes were placed perianally for recording activity of the anal sphincter. A vaginal catheter was inserted to record the abdominal pressure. The entire process was displayed and analyzed using the [employer] Urodynamic machine and software.     Findings:   Results for POC orders placed in visit on 03/28/22   POCT Urinalysis no Micro   Result Value Ref Range    Color, UA yellow     Clarity, UA clear     Glucose, UA POC neg     Bilirubin, UA neg     Ketones, UA neg     Spec Grav, UA 1.015     Blood, UA POC neg     pH, UA 6.5     Protein, UA POC neg     Urobilinogen, UA neg     Leukocytes, UA neg     Nitrite, UA neg        Uroflow:  Patient was able to void for Uroflow    Voided Volume: 815.1ml  PVR: 350ml  Max Flow: 19ml/sec with an average flow rate of 10ml/sec    CMG:  First sensation: 35ml  First desire: 118ml  Strong desire: 184ml  Max capacity: 431ml  Uninhibited detrusor contraction: No     Leak Point Pressures:   150ml with  negative  Sitting cough, negative  Sitting valsalva, with reduction     300ml with  negative  Sitting cough,  negative  Sitting valsalva,  with reduction    431ml (max) with negative  Sitting cough,  negative  Sitting valsalva, with reduction    Pressure voiding study:  Patient was unable to void with catheters in place to obtain pressure voiding study. Patient was straight catheterized to remove fluid from bladder 725 ml. MUCP:  UPP unable to be done due to yellow single lumen catheter in urethra/bladder. EMG: does correlate    Assessment:  Clinical Diagnosis:   Prolapse  Large capacity  SAM demonstrated at Cysto, not seen today    Plan: Surgery  Discuss at next visit:   Would move forward with sling at the time of surgery.   Sagar Varela MD

## 2022-03-30 ENCOUNTER — TELEPHONE (OUTPATIENT)
Dept: UROGYNECOLOGY | Age: 52
End: 2022-03-30

## 2022-03-30 NOTE — TELEPHONE ENCOUNTER
Called and left VM with patient reviewing which medications to hold week prior and day of surgery, let her know to please call with any questions.

## 2022-04-04 ENCOUNTER — TELEPHONE (OUTPATIENT)
Dept: UROGYNECOLOGY | Age: 52
End: 2022-04-04

## 2022-04-19 NOTE — FLOWSHEET NOTE
C-diff Questionnaire:     * Admitted with diarrhea? [] YES    [x]  NO     *Prior history of C-Diff. In last 3 months? [] YES    [x]  NO     *Antibiotic use in the past 6-8 weeks? []  NO    [x]  YES      If yes, which: REASON_______UTI__________     *Prior hospitalization or nursing home in the last month? []  YES    [x]  NO     [facility] FIRST. .call before you fall    [address] time__925     Surgery time__1125    Do not eat or drink anything after 12:00 midnight prior to your surgery. This includes water chewing gum, mints and ice chips- the Day of Surgery. You may brush your teeth and gargle the morning of your surgery, but do not swallow the water     Please see your family doctor/pediatrician for a history and physical and/or questions concerning medications. Bring any test results/reports from your physicians office. If you are under the care of a heart doctor or specialist doctor, please be aware that you may be asked to them for clearance    You may be asked to stop blood thinners such as Coumadin, Plavix, Fragmin, Lovenox, etc., or any anti-inflammatories such as:  Aspirin, Ibuprofen, Advil, Naproxen prior to your surgery. We also ask that you stop any OTC medications such as fish oil, vitamin E, glucosamine, garlic, Multivitamins, COQ 10, etc.    We ask that you do not smoke 24 hours prior to surgery  We ask that you do not  drink any alcoholic beverages 24 hours prior to surgery     You must make arrangements for a responsible adult to take you home after your surgery. For your safety you will not be allowed to leave alone or drive yourself home. Your surgery will be cancelled if you do not have a ride home. Also for your safety, it is strongly suggested that someone stay with you the first 24 hours after your surgery.      A parent or legal guardian must accompany a child scheduled for surgery and plan to stay at the hospital until the child is discharged. Please do not bring other children with you. For your comfort, please wear simple loose fitting clothing to the hospital.  Please do not bring valuables. Do not wear any make-up or nail polish on your fingers or toes. For your safety, please do not wear any jewelry or body piercing's on the day of surgery. All jewelry must be removed. If you have dentures, they will be removed before going to operating room. For your convenience, we will provide you with a container. If you wear contact lenses or glasses, they will be removed, please bring a case for them. If you have a living will and a durable power of  for healthcare, please bring in a copy. As part of our patient safety program to minimize surgical site infections, we ask you to do the following:    · Please notify your surgeon if you develop any illness between         now and the day of your surgery. · This includes a cough, cold, fever, sore throat, nausea,         or vomiting, and diarrhea, etc.  ·  Please notify your surgeon if you experience dizziness, shortness         of breath or blurred vision between now and the time of your surgery. Do not shave your operative site 96 hours prior to surgery. For face and neck surgery, men may use an electric razor 48 hours   prior to surgery. You may shower the night before surgery or the morning of   your surgery with an antibacterial soap. You will need to bring a photo ID and insurance card     If you use a C-pap or Bi-pap machine, please bring your machine with you to the hospital     Our goal is to provide you with excellent care, therefore, visitors will be limited to so that we may focus on providing this care for you. Please contact your surgeon office, if you have any further questions.                  [facility] phone number:  [phone] PAT fax number:  [phone]    Please note these are generalized instructions for all surgical cases, you may be provided with more specific instructions according to your surgery. Pt has sleep apnea-does not use cpap machine- over 2 years since she used it

## 2022-04-19 NOTE — PROGRESS NOTES
Flor in epic from 3/23/2022 Agustín Springer    In epic-pulmonologist, Jasmyn Washington cleared pt for surgery

## 2022-04-25 ENCOUNTER — TELEPHONE (OUTPATIENT)
Dept: UROGYNECOLOGY | Age: 52
End: 2022-04-25

## 2022-04-25 NOTE — TELEPHONE ENCOUNTER
Surgery scheduled for 5/2/22. Left a message for patient if she has any additional questions prior to surgery. Advised to stop all vitamins, herbal supplements, Aspirin, or NSAIDS the week prior to surgery. Medications to hold morning of surgery:   Flonase. Asked patient to call office if there are any additional questions.

## 2022-04-29 ENCOUNTER — ANESTHESIA EVENT (OUTPATIENT)
Dept: OPERATING ROOM | Age: 52
DRG: 514 | End: 2022-04-29
Payer: COMMERCIAL

## 2022-05-02 ENCOUNTER — HOSPITAL ENCOUNTER (INPATIENT)
Age: 52
LOS: 1 days | Discharge: HOME | DRG: 514 | End: 2022-05-03
Attending: OBSTETRICS & GYNECOLOGY | Admitting: OBSTETRICS & GYNECOLOGY
Payer: COMMERCIAL

## 2022-05-02 ENCOUNTER — ANESTHESIA (OUTPATIENT)
Dept: OPERATING ROOM | Age: 52
DRG: 514 | End: 2022-05-02
Payer: COMMERCIAL

## 2022-05-02 VITALS
TEMPERATURE: 96.8 F | SYSTOLIC BLOOD PRESSURE: 109 MMHG | DIASTOLIC BLOOD PRESSURE: 71 MMHG | RESPIRATION RATE: 10 BRPM | OXYGEN SATURATION: 92 %

## 2022-05-02 DIAGNOSIS — G89.18 POST-OP PAIN: Primary | ICD-10-CM

## 2022-05-02 PROBLEM — N81.9 FEMALE GENITAL PROLAPSE: Status: ACTIVE | Noted: 2022-05-02

## 2022-05-02 PROBLEM — N81.6 FEMALE RECTOCELE WITH ENTEROCELE: Status: ACTIVE | Noted: 2022-05-02

## 2022-05-02 PROBLEM — K46.9 FEMALE RECTOCELE WITH ENTEROCELE: Status: ACTIVE | Noted: 2022-05-02

## 2022-05-02 PROCEDURE — 57288 REPAIR BLADDER DEFECT: CPT | Performed by: OBSTETRICS & GYNECOLOGY

## 2022-05-02 PROCEDURE — 0TJB8ZZ INSPECTION OF BLADDER, VIA NATURAL OR ARTIFICIAL OPENING ENDOSCOPIC: ICD-10-PCS | Performed by: OBSTETRICS & GYNECOLOGY

## 2022-05-02 PROCEDURE — 3700000001 HC ADD 15 MINUTES (ANESTHESIA): Performed by: OBSTETRICS & GYNECOLOGY

## 2022-05-02 PROCEDURE — 3600000014 HC SURGERY LEVEL 4 ADDTL 15MIN: Performed by: OBSTETRICS & GYNECOLOGY

## 2022-05-02 PROCEDURE — 0US97ZZ REPOSITION UTERUS, VIA NATURAL OR ARTIFICIAL OPENING: ICD-10-PCS | Performed by: OBSTETRICS & GYNECOLOGY

## 2022-05-02 PROCEDURE — 2580000003 HC RX 258: Performed by: OBSTETRICS & GYNECOLOGY

## 2022-05-02 PROCEDURE — 6360000002 HC RX W HCPCS: Performed by: OBSTETRICS & GYNECOLOGY

## 2022-05-02 PROCEDURE — 0JQC0ZZ REPAIR PELVIC REGION SUBCUTANEOUS TISSUE AND FASCIA, OPEN APPROACH: ICD-10-PCS | Performed by: OBSTETRICS & GYNECOLOGY

## 2022-05-02 PROCEDURE — 7100000001 HC PACU RECOVERY - ADDTL 15 MIN: Performed by: OBSTETRICS & GYNECOLOGY

## 2022-05-02 PROCEDURE — 2500000003 HC RX 250 WO HCPCS: Performed by: OBSTETRICS & GYNECOLOGY

## 2022-05-02 PROCEDURE — 57283 COLPOPEXY INTRAPERITONEAL: CPT | Performed by: OBSTETRICS & GYNECOLOGY

## 2022-05-02 PROCEDURE — G0378 HOSPITAL OBSERVATION PER HR: HCPCS

## 2022-05-02 PROCEDURE — 6360000002 HC RX W HCPCS: Performed by: NURSE ANESTHETIST, CERTIFIED REGISTERED

## 2022-05-02 PROCEDURE — 6360000002 HC RX W HCPCS: Performed by: ANESTHESIOLOGY

## 2022-05-02 PROCEDURE — 0TSD0ZZ REPOSITION URETHRA, OPEN APPROACH: ICD-10-PCS | Performed by: OBSTETRICS & GYNECOLOGY

## 2022-05-02 PROCEDURE — 6370000000 HC RX 637 (ALT 250 FOR IP): Performed by: OBSTETRICS & GYNECOLOGY

## 2022-05-02 PROCEDURE — 2580000003 HC RX 258: Performed by: ANESTHESIOLOGY

## 2022-05-02 PROCEDURE — 7100000000 HC PACU RECOVERY - FIRST 15 MIN: Performed by: OBSTETRICS & GYNECOLOGY

## 2022-05-02 PROCEDURE — 2500000003 HC RX 250 WO HCPCS: Performed by: NURSE ANESTHETIST, CERTIFIED REGISTERED

## 2022-05-02 PROCEDURE — C1771 REP DEV, URINARY, W/SLING: HCPCS | Performed by: OBSTETRICS & GYNECOLOGY

## 2022-05-02 PROCEDURE — 3600000004 HC SURGERY LEVEL 4 BASE: Performed by: OBSTETRICS & GYNECOLOGY

## 2022-05-02 PROCEDURE — 2709999900 HC NON-CHARGEABLE SUPPLY: Performed by: OBSTETRICS & GYNECOLOGY

## 2022-05-02 PROCEDURE — A4217 STERILE WATER/SALINE, 500 ML: HCPCS | Performed by: OBSTETRICS & GYNECOLOGY

## 2022-05-02 PROCEDURE — 57250 REPAIR RECTUM & VAGINA: CPT | Performed by: OBSTETRICS & GYNECOLOGY

## 2022-05-02 PROCEDURE — 1200000000 HC SEMI PRIVATE

## 2022-05-02 PROCEDURE — 3700000000 HC ANESTHESIA ATTENDED CARE: Performed by: OBSTETRICS & GYNECOLOGY

## 2022-05-02 PROCEDURE — 2500000003 HC RX 250 WO HCPCS

## 2022-05-02 DEVICE — SUPRAPUBIC MID-URETHRAL SLING
Type: IMPLANTABLE DEVICE | Site: PELVIS | Status: FUNCTIONAL
Brand: LYNX™ BLUE SYSTEM

## 2022-05-02 RX ORDER — TRAZODONE HYDROCHLORIDE 100 MG/1
100 TABLET ORAL PRN
Status: DISCONTINUED | OUTPATIENT
Start: 2022-05-02 | End: 2022-05-02

## 2022-05-02 RX ORDER — NORTRIPTYLINE HYDROCHLORIDE 25 MG/1
50 CAPSULE ORAL NIGHTLY
Status: DISCONTINUED | OUTPATIENT
Start: 2022-05-02 | End: 2022-05-03 | Stop reason: HOSPADM

## 2022-05-02 RX ORDER — ENOXAPARIN SODIUM 100 MG/ML
40 INJECTION SUBCUTANEOUS ONCE
Status: COMPLETED | OUTPATIENT
Start: 2022-05-02 | End: 2022-05-02

## 2022-05-02 RX ORDER — TRAZODONE HYDROCHLORIDE 50 MG/1
50 TABLET ORAL NIGHTLY PRN
COMMUNITY

## 2022-05-02 RX ORDER — FENTANYL CITRATE 50 UG/ML
25 INJECTION, SOLUTION INTRAMUSCULAR; INTRAVENOUS EVERY 5 MIN PRN
Status: DISCONTINUED | OUTPATIENT
Start: 2022-05-02 | End: 2022-05-02 | Stop reason: HOSPADM

## 2022-05-02 RX ORDER — OXYCODONE HYDROCHLORIDE 10 MG/1
10 TABLET ORAL PRN
Status: DISCONTINUED | OUTPATIENT
Start: 2022-05-02 | End: 2022-05-02 | Stop reason: HOSPADM

## 2022-05-02 RX ORDER — SODIUM CHLORIDE 9 MG/ML
INJECTION, SOLUTION INTRAVENOUS PRN
Status: DISCONTINUED | OUTPATIENT
Start: 2022-05-02 | End: 2022-05-02 | Stop reason: HOSPADM

## 2022-05-02 RX ORDER — PROPOFOL 10 MG/ML
INJECTION, EMULSION INTRAVENOUS PRN
Status: DISCONTINUED | OUTPATIENT
Start: 2022-05-02 | End: 2022-05-02 | Stop reason: SDUPTHER

## 2022-05-02 RX ORDER — SODIUM CHLORIDE 0.9 % (FLUSH) 0.9 %
5-40 SYRINGE (ML) INJECTION EVERY 12 HOURS SCHEDULED
Status: DISCONTINUED | OUTPATIENT
Start: 2022-05-02 | End: 2022-05-02 | Stop reason: HOSPADM

## 2022-05-02 RX ORDER — ONDANSETRON 2 MG/ML
INJECTION INTRAMUSCULAR; INTRAVENOUS PRN
Status: DISCONTINUED | OUTPATIENT
Start: 2022-05-02 | End: 2022-05-02 | Stop reason: SDUPTHER

## 2022-05-02 RX ORDER — NORTRIPTYLINE HYDROCHLORIDE 25 MG/1
50 CAPSULE ORAL NIGHTLY
COMMUNITY

## 2022-05-02 RX ORDER — SODIUM CHLORIDE 0.9 % (FLUSH) 0.9 %
5-40 SYRINGE (ML) INJECTION EVERY 12 HOURS SCHEDULED
Status: DISCONTINUED | OUTPATIENT
Start: 2022-05-02 | End: 2022-05-03 | Stop reason: HOSPADM

## 2022-05-02 RX ORDER — SODIUM CHLORIDE 9 MG/ML
25 INJECTION, SOLUTION INTRAVENOUS PRN
Status: DISCONTINUED | OUTPATIENT
Start: 2022-05-02 | End: 2022-05-02 | Stop reason: SDUPTHER

## 2022-05-02 RX ORDER — MIDAZOLAM HYDROCHLORIDE 1 MG/ML
INJECTION INTRAMUSCULAR; INTRAVENOUS PRN
Status: DISCONTINUED | OUTPATIENT
Start: 2022-05-02 | End: 2022-05-02 | Stop reason: SDUPTHER

## 2022-05-02 RX ORDER — SODIUM CHLORIDE 0.9 % (FLUSH) 0.9 %
5-40 SYRINGE (ML) INJECTION PRN
Status: DISCONTINUED | OUTPATIENT
Start: 2022-05-02 | End: 2022-05-03 | Stop reason: HOSPADM

## 2022-05-02 RX ORDER — KETOROLAC TROMETHAMINE 15 MG/ML
15 INJECTION, SOLUTION INTRAMUSCULAR; INTRAVENOUS EVERY 6 HOURS PRN
Status: DISCONTINUED | OUTPATIENT
Start: 2022-05-02 | End: 2022-05-02

## 2022-05-02 RX ORDER — MAGNESIUM HYDROXIDE 1200 MG/15ML
LIQUID ORAL CONTINUOUS PRN
Status: COMPLETED | OUTPATIENT
Start: 2022-05-02 | End: 2022-05-02

## 2022-05-02 RX ORDER — SODIUM CHLORIDE 0.9 % (FLUSH) 0.9 %
5-40 SYRINGE (ML) INJECTION PRN
Status: DISCONTINUED | OUTPATIENT
Start: 2022-05-02 | End: 2022-05-02 | Stop reason: HOSPADM

## 2022-05-02 RX ORDER — SODIUM CHLORIDE 9 MG/ML
INJECTION, SOLUTION INTRAVENOUS CONTINUOUS
Status: DISCONTINUED | OUTPATIENT
Start: 2022-05-02 | End: 2022-05-03 | Stop reason: HOSPADM

## 2022-05-02 RX ORDER — ALBUTEROL SULFATE 90 UG/1
2 AEROSOL, METERED RESPIRATORY (INHALATION) EVERY 6 HOURS PRN
Status: DISCONTINUED | OUTPATIENT
Start: 2022-05-02 | End: 2022-05-03 | Stop reason: HOSPADM

## 2022-05-02 RX ORDER — SODIUM CHLORIDE 0.9 % (FLUSH) 0.9 %
5-40 SYRINGE (ML) INJECTION PRN
Status: DISCONTINUED | OUTPATIENT
Start: 2022-05-02 | End: 2022-05-02 | Stop reason: SDUPTHER

## 2022-05-02 RX ORDER — LITHIUM CARBONATE 450 MG
450 TABLET, EXTENDED RELEASE ORAL 2 TIMES DAILY
Status: DISCONTINUED | OUTPATIENT
Start: 2022-05-02 | End: 2022-05-03 | Stop reason: HOSPADM

## 2022-05-02 RX ORDER — CETIRIZINE HYDROCHLORIDE 10 MG/1
5 TABLET ORAL DAILY
Status: DISCONTINUED | OUTPATIENT
Start: 2022-05-02 | End: 2022-05-03 | Stop reason: HOSPADM

## 2022-05-02 RX ORDER — SODIUM CHLORIDE 9 MG/ML
INJECTION, SOLUTION INTRAVENOUS CONTINUOUS
Status: DISCONTINUED | OUTPATIENT
Start: 2022-05-02 | End: 2022-05-02 | Stop reason: HOSPADM

## 2022-05-02 RX ORDER — SODIUM CHLORIDE 0.9 % (FLUSH) 0.9 %
5-40 SYRINGE (ML) INJECTION EVERY 12 HOURS SCHEDULED
Status: DISCONTINUED | OUTPATIENT
Start: 2022-05-02 | End: 2022-05-02 | Stop reason: SDUPTHER

## 2022-05-02 RX ORDER — SIMETHICONE 80 MG
160 TABLET,CHEWABLE ORAL 4 TIMES DAILY PRN
Status: DISCONTINUED | OUTPATIENT
Start: 2022-05-02 | End: 2022-05-03 | Stop reason: HOSPADM

## 2022-05-02 RX ORDER — FENTANYL CITRATE 50 UG/ML
50 INJECTION, SOLUTION INTRAMUSCULAR; INTRAVENOUS EVERY 5 MIN PRN
Status: DISCONTINUED | OUTPATIENT
Start: 2022-05-02 | End: 2022-05-02 | Stop reason: HOSPADM

## 2022-05-02 RX ORDER — TOPIRAMATE 100 MG/1
100 TABLET, FILM COATED ORAL 2 TIMES DAILY
Status: DISCONTINUED | OUTPATIENT
Start: 2022-05-02 | End: 2022-05-02

## 2022-05-02 RX ORDER — PANTOPRAZOLE SODIUM 40 MG/1
40 TABLET, DELAYED RELEASE ORAL
Status: DISCONTINUED | OUTPATIENT
Start: 2022-05-03 | End: 2022-05-03 | Stop reason: HOSPADM

## 2022-05-02 RX ORDER — TRAZODONE HYDROCHLORIDE 50 MG/1
50 TABLET ORAL NIGHTLY PRN
Status: DISCONTINUED | OUTPATIENT
Start: 2022-05-02 | End: 2022-05-03 | Stop reason: HOSPADM

## 2022-05-02 RX ORDER — PROMETHAZINE HYDROCHLORIDE 25 MG/1
12.5 TABLET ORAL EVERY 6 HOURS PRN
Status: DISCONTINUED | OUTPATIENT
Start: 2022-05-02 | End: 2022-05-03 | Stop reason: HOSPADM

## 2022-05-02 RX ORDER — MONTELUKAST SODIUM 10 MG/1
10 TABLET ORAL NIGHTLY
Status: DISCONTINUED | OUTPATIENT
Start: 2022-05-02 | End: 2022-05-03 | Stop reason: HOSPADM

## 2022-05-02 RX ORDER — OXYCODONE HYDROCHLORIDE 10 MG/1
10 TABLET ORAL EVERY 4 HOURS PRN
Status: DISCONTINUED | OUTPATIENT
Start: 2022-05-02 | End: 2022-05-03 | Stop reason: HOSPADM

## 2022-05-02 RX ORDER — FENTANYL CITRATE 50 UG/ML
INJECTION, SOLUTION INTRAMUSCULAR; INTRAVENOUS PRN
Status: DISCONTINUED | OUTPATIENT
Start: 2022-05-02 | End: 2022-05-02 | Stop reason: SDUPTHER

## 2022-05-02 RX ORDER — ONDANSETRON 2 MG/ML
4 INJECTION INTRAMUSCULAR; INTRAVENOUS
Status: DISCONTINUED | OUTPATIENT
Start: 2022-05-02 | End: 2022-05-02 | Stop reason: HOSPADM

## 2022-05-02 RX ORDER — DEXAMETHASONE SODIUM PHOSPHATE 4 MG/ML
INJECTION, SOLUTION INTRA-ARTICULAR; INTRALESIONAL; INTRAMUSCULAR; INTRAVENOUS; SOFT TISSUE PRN
Status: DISCONTINUED | OUTPATIENT
Start: 2022-05-02 | End: 2022-05-02 | Stop reason: SDUPTHER

## 2022-05-02 RX ORDER — OXYCODONE HYDROCHLORIDE 5 MG/1
5 TABLET ORAL PRN
Status: DISCONTINUED | OUTPATIENT
Start: 2022-05-02 | End: 2022-05-02 | Stop reason: HOSPADM

## 2022-05-02 RX ORDER — ROCURONIUM BROMIDE 10 MG/ML
INJECTION, SOLUTION INTRAVENOUS PRN
Status: DISCONTINUED | OUTPATIENT
Start: 2022-05-02 | End: 2022-05-02 | Stop reason: SDUPTHER

## 2022-05-02 RX ORDER — OXYCODONE HYDROCHLORIDE 5 MG/1
5 TABLET ORAL EVERY 4 HOURS PRN
Status: DISCONTINUED | OUTPATIENT
Start: 2022-05-02 | End: 2022-05-03 | Stop reason: HOSPADM

## 2022-05-02 RX ORDER — NORTRIPTYLINE HYDROCHLORIDE 25 MG/1
25 CAPSULE ORAL NIGHTLY
Status: DISCONTINUED | OUTPATIENT
Start: 2022-05-02 | End: 2022-05-02

## 2022-05-02 RX ORDER — SODIUM CHLORIDE 9 MG/ML
INJECTION, SOLUTION INTRAVENOUS PRN
Status: DISCONTINUED | OUTPATIENT
Start: 2022-05-02 | End: 2022-05-03 | Stop reason: HOSPADM

## 2022-05-02 RX ORDER — ONDANSETRON 2 MG/ML
4 INJECTION INTRAMUSCULAR; INTRAVENOUS EVERY 6 HOURS PRN
Status: DISCONTINUED | OUTPATIENT
Start: 2022-05-02 | End: 2022-05-03 | Stop reason: HOSPADM

## 2022-05-02 RX ORDER — PHENYLEPHRINE HCL IN 0.9% NACL 1 MG/10 ML
SYRINGE (ML) INTRAVENOUS PRN
Status: DISCONTINUED | OUTPATIENT
Start: 2022-05-02 | End: 2022-05-02 | Stop reason: SDUPTHER

## 2022-05-02 RX ORDER — LIDOCAINE HYDROCHLORIDE 20 MG/ML
INJECTION, SOLUTION EPIDURAL; INFILTRATION; INTRACAUDAL; PERINEURAL PRN
Status: DISCONTINUED | OUTPATIENT
Start: 2022-05-02 | End: 2022-05-02 | Stop reason: SDUPTHER

## 2022-05-02 RX ORDER — KETOROLAC TROMETHAMINE 30 MG/ML
INJECTION, SOLUTION INTRAMUSCULAR; INTRAVENOUS PRN
Status: DISCONTINUED | OUTPATIENT
Start: 2022-05-02 | End: 2022-05-02 | Stop reason: SDUPTHER

## 2022-05-02 RX ORDER — BUSPIRONE HYDROCHLORIDE 15 MG/1
15 TABLET ORAL 2 TIMES DAILY
Status: DISCONTINUED | OUTPATIENT
Start: 2022-05-02 | End: 2022-05-03 | Stop reason: HOSPADM

## 2022-05-02 RX ADMIN — Medication 100 MCG: at 12:25

## 2022-05-02 RX ADMIN — PROPOFOL 150 MG: 10 INJECTION, EMULSION INTRAVENOUS at 11:41

## 2022-05-02 RX ADMIN — OXYCODONE HYDROCHLORIDE 10 MG: 10 TABLET ORAL at 19:25

## 2022-05-02 RX ADMIN — FENTANYL CITRATE 100 MCG: 50 INJECTION INTRAMUSCULAR; INTRAVENOUS at 11:39

## 2022-05-02 RX ADMIN — ROCURONIUM BROMIDE 50 MG: 10 INJECTION INTRAVENOUS at 11:41

## 2022-05-02 RX ADMIN — SODIUM CHLORIDE: 9 INJECTION, SOLUTION INTRAVENOUS at 17:44

## 2022-05-02 RX ADMIN — MIDAZOLAM 2 MG: 1 INJECTION INTRAMUSCULAR; INTRAVENOUS at 11:36

## 2022-05-02 RX ADMIN — ROCURONIUM BROMIDE 10 MG: 10 INJECTION INTRAVENOUS at 12:23

## 2022-05-02 RX ADMIN — LITHIUM CARBONATE 450 MG: 450 TABLET, EXTENDED RELEASE ORAL at 20:21

## 2022-05-02 RX ADMIN — OXYCODONE HYDROCHLORIDE 10 MG: 10 TABLET ORAL at 23:34

## 2022-05-02 RX ADMIN — CETIRIZINE HYDROCHLORIDE 5 MG: 10 TABLET, FILM COATED ORAL at 17:35

## 2022-05-02 RX ADMIN — TRAZODONE HYDROCHLORIDE 50 MG: 50 TABLET ORAL at 23:34

## 2022-05-02 RX ADMIN — FENTANYL CITRATE 25 MCG: 50 INJECTION, SOLUTION INTRAMUSCULAR; INTRAVENOUS at 14:30

## 2022-05-02 RX ADMIN — FENTANYL CITRATE 25 MCG: 50 INJECTION, SOLUTION INTRAMUSCULAR; INTRAVENOUS at 14:02

## 2022-05-02 RX ADMIN — SUGAMMADEX 200 MG: 100 INJECTION, SOLUTION INTRAVENOUS at 13:14

## 2022-05-02 RX ADMIN — FLUORESCEIN SODIUM 10 MG: 100 INJECTION INTRAVENOUS at 12:43

## 2022-05-02 RX ADMIN — BUSPIRONE HYDROCHLORIDE 15 MG: 15 TABLET ORAL at 20:21

## 2022-05-02 RX ADMIN — SODIUM CHLORIDE: 9 INJECTION, SOLUTION INTRAVENOUS at 12:44

## 2022-05-02 RX ADMIN — KETOROLAC TROMETHAMINE 30 MG: 30 INJECTION, SOLUTION INTRAMUSCULAR at 13:14

## 2022-05-02 RX ADMIN — SODIUM CHLORIDE: 9 INJECTION, SOLUTION INTRAVENOUS at 11:36

## 2022-05-02 RX ADMIN — DEXAMETHASONE SODIUM PHOSPHATE 8 MG: 4 INJECTION, SOLUTION INTRAMUSCULAR; INTRAVENOUS at 12:45

## 2022-05-02 RX ADMIN — CEFAZOLIN SODIUM 2000 MG: 10 INJECTION, POWDER, FOR SOLUTION INTRAVENOUS at 11:36

## 2022-05-02 RX ADMIN — HYDROMORPHONE HYDROCHLORIDE 0.5 MG: 1 INJECTION, SOLUTION INTRAMUSCULAR; INTRAVENOUS; SUBCUTANEOUS at 13:14

## 2022-05-02 RX ADMIN — LIDOCAINE HYDROCHLORIDE 100 MG: 20 INJECTION, SOLUTION EPIDURAL; INFILTRATION; INTRACAUDAL; PERINEURAL at 11:41

## 2022-05-02 RX ADMIN — ONDANSETRON 4 MG: 2 INJECTION INTRAMUSCULAR; INTRAVENOUS at 12:45

## 2022-05-02 RX ADMIN — ENOXAPARIN SODIUM 40 MG: 40 INJECTION SUBCUTANEOUS at 11:28

## 2022-05-02 RX ADMIN — NORTRIPTYLINE HYDROCHLORIDE 50 MG: 25 CAPSULE ORAL at 20:21

## 2022-05-02 RX ADMIN — MONTELUKAST 10 MG: 10 TABLET, FILM COATED ORAL at 20:21

## 2022-05-02 ASSESSMENT — PULMONARY FUNCTION TESTS
PIF_VALUE: 24
PIF_VALUE: 21
PIF_VALUE: 24
PIF_VALUE: 4
PIF_VALUE: 20
PIF_VALUE: 20
PIF_VALUE: 24
PIF_VALUE: 2
PIF_VALUE: 3
PIF_VALUE: 21
PIF_VALUE: 20
PIF_VALUE: 1
PIF_VALUE: 3
PIF_VALUE: 21
PIF_VALUE: 2
PIF_VALUE: 24
PIF_VALUE: 24
PIF_VALUE: 21
PIF_VALUE: 20
PIF_VALUE: 21
PIF_VALUE: 23
PIF_VALUE: 20
PIF_VALUE: 24
PIF_VALUE: 21
PIF_VALUE: 24
PIF_VALUE: 21
PIF_VALUE: 20
PIF_VALUE: 20
PIF_VALUE: 21
PIF_VALUE: 20
PIF_VALUE: 21
PIF_VALUE: 24
PIF_VALUE: 21
PIF_VALUE: 20
PIF_VALUE: 16
PIF_VALUE: 20
PIF_VALUE: 24
PIF_VALUE: 20
PIF_VALUE: 21
PIF_VALUE: 24
PIF_VALUE: 14
PIF_VALUE: 21
PIF_VALUE: 20
PIF_VALUE: 1
PIF_VALUE: 18
PIF_VALUE: 22
PIF_VALUE: 21
PIF_VALUE: 20
PIF_VALUE: 20
PIF_VALUE: 2
PIF_VALUE: 2
PIF_VALUE: 21
PIF_VALUE: 24
PIF_VALUE: 20
PIF_VALUE: 14
PIF_VALUE: 21
PIF_VALUE: 21
PIF_VALUE: 24
PIF_VALUE: 21
PIF_VALUE: 21
PIF_VALUE: 20
PIF_VALUE: 0
PIF_VALUE: 1
PIF_VALUE: 3
PIF_VALUE: 21
PIF_VALUE: 2
PIF_VALUE: 20
PIF_VALUE: 21
PIF_VALUE: 24
PIF_VALUE: 20
PIF_VALUE: 21
PIF_VALUE: 20
PIF_VALUE: 2
PIF_VALUE: 21
PIF_VALUE: 20
PIF_VALUE: 21
PIF_VALUE: 24
PIF_VALUE: 21
PIF_VALUE: 20
PIF_VALUE: 24
PIF_VALUE: 20
PIF_VALUE: 24
PIF_VALUE: 0
PIF_VALUE: 24
PIF_VALUE: 4
PIF_VALUE: 21
PIF_VALUE: 20
PIF_VALUE: 1
PIF_VALUE: 20
PIF_VALUE: 24
PIF_VALUE: 24
PIF_VALUE: 23
PIF_VALUE: 21
PIF_VALUE: 21
PIF_VALUE: 15

## 2022-05-02 ASSESSMENT — PAIN SCALES - GENERAL
PAINLEVEL_OUTOF10: 6
PAINLEVEL_OUTOF10: 6
PAINLEVEL_OUTOF10: 7
PAINLEVEL_OUTOF10: 5
PAINLEVEL_OUTOF10: 0
PAINLEVEL_OUTOF10: 5

## 2022-05-02 ASSESSMENT — PAIN DESCRIPTION - PAIN TYPE
TYPE: SURGICAL PAIN
TYPE: SURGICAL PAIN

## 2022-05-02 ASSESSMENT — PAIN - FUNCTIONAL ASSESSMENT
PAIN_FUNCTIONAL_ASSESSMENT: 0-10
PAIN_FUNCTIONAL_ASSESSMENT: ACTIVITIES ARE NOT PREVENTED
PAIN_FUNCTIONAL_ASSESSMENT: ACTIVITIES ARE NOT PREVENTED

## 2022-05-02 ASSESSMENT — PAIN DESCRIPTION - LOCATION
LOCATION: VAGINA
LOCATION: PERINEUM;VAGINA

## 2022-05-02 ASSESSMENT — LIFESTYLE VARIABLES: SMOKING_STATUS: 1

## 2022-05-02 ASSESSMENT — PAIN DESCRIPTION - DESCRIPTORS
DESCRIPTORS: SORE
DESCRIPTORS: ACHING
DESCRIPTORS: ACHING;DISCOMFORT
DESCRIPTORS: SORE
DESCRIPTORS: SORE

## 2022-05-02 ASSESSMENT — PAIN DESCRIPTION - ONSET: ONSET: ON-GOING

## 2022-05-02 ASSESSMENT — PAIN DESCRIPTION - ORIENTATION
ORIENTATION: INNER
ORIENTATION: RIGHT;LEFT

## 2022-05-02 ASSESSMENT — PAIN DESCRIPTION - FREQUENCY: FREQUENCY: CONTINUOUS

## 2022-05-02 NOTE — H&P
Date of Surgery Update:  Twyla Landry was seen, history and physical examination reviewed, and patient examined by me today. There have been no significant clinical changes since the completion of the previous history and physical. The surgical site was confirmed by the patient and me. I have presented reasonable alternatives to the patient's proposed care, treatment, and services. The discussion I have done encompassed risks, benefits, and side effects related to the alternatives and the risks related to not receiving the proposed care, treatment, and services. All questions answered. Patient wishes to proceed.      Electronically signed by: Sushila Gaytan MD,5/2/2022,11:06 AM

## 2022-05-02 NOTE — PROGRESS NOTES
RN noted scant serosanginous drainage on right lower site with glue only. Gauze applied.  Awaiting return call from MD>

## 2022-05-02 NOTE — PROGRESS NOTES
Patient arrived to room via bed from PACU. Patient drowsy but oriented x4. Denies pain at this time. Vitals within defined limits. Patient oriented to room. Call light in reach. Patient has left and right incisions on grabiel area. Both covered with [place]. R side is oozing blood MD aware changed guaze and applied bandage per MD order. Patient with old left knee replacement scar that is healed. Buttocks with scattered red areas no open sores noted. Will continue to monitor.

## 2022-05-02 NOTE — BRIEF OP NOTE
Brief Postoperative Note      Patient: Radhika Ellis  YOB: 1970  MRN: [id number]    Date of Procedure: 5/2/2022    Pre-Op Diagnosis: VAGINAL VAULT PROLAPSE, CYSTOCELE, RECTOCELE, ENTEROCELE, GENUINE STRESS INCONTINENCE    Post-Op Diagnosis: Same       Procedure(s):  VAGINAL VAULT SUSPENSION  POSTERIOR REPAIR  RETROPUBIC SLING  CYSTOSCOPY    Surgeon(s):  Janiya Young MD    Assistant:  Surgical Assistant: Génesis Evans; Desmond Celis; Mckayla Fowler    Anesthesia: General    Estimated Blood Loss (mL): 088    Complications: None    Specimens:   * No specimens in log *    Implants:  Implant Name Type Inv.  Item Serial No.  Lot No. LRB No. Used Action   SYSTEM SLNG PAULIE SUPRPUB MID Romulo [employer] - [id number]  SYSTEM SLNG PAULIE SUPRPUB MID Pam[employer]- [id number] N/A 1 Implanted         Drains:   Urinary Catheter 2 Way (Active)       Findings: large rectocele, enterocele, and vault prolapse    Electronically signed by Tricia Rodrigues MD on 5/2/2022 at 1:17 PM

## 2022-05-02 NOTE — PROGRESS NOTES
Attempted to contact MD Maral Carrington regarding scant drainage on lower right site. Left message.

## 2022-05-02 NOTE — OP NOTE
Operative Note      Patient: Anshul Cabrera  YOB: 1970  MRN: [id number]    Date of Procedure: 5/2/2022    Pre-Op Diagnosis: VAGINAL VAULT PROLAPSE, CYSTOCELE, RECTOCELE, ENTEROCELE, GENUINE STRESS INCONTINENCE    Post-Op Diagnosis: Same       Procedures: Procedure(s):  VAGINAL VAULT [facility]  CYSTOSCOPY     Surgeon: Surgeon(s):  Peri Jaimes MD    Anesthesia: General    Assistant: Surgical Assistant: Patt Read; Sanjay Abdullahi; Ayana Trent    Estimated Blood Loss (mL): 125     IV FLUIDS: 1400 milliliter(s) In: 1000 [I.V.:1000]  Out: 125     URINE OUTPUT: 100 milliters(s)   Output by Drain (mL) 04/30/22 0701 - 04/30/22 1900 04/30/22 1901 - 05/01/22 0700 05/01/22 0701 - 05/01/22 1900 05/01/22 1901 - 05/02/22 0700 05/02/22 0701 - 05/02/22 1318   Requested LDAs do not have output data documented. Complications: None    Specimens: * No specimens in log *    Implants:   Implant Name Type Inv. Item Serial No.  Lot No. LRB No. Used Action   SYSTEM SLNG PAULIE [facility] Kevin - [id number]  [address] [employer] [id number] N/A 1 Implanted         Drains:   Urinary Catheter 2 Way (Active)       FINDINGS: Stage 3 prolapse. Normal appearing bladder and urethra without injury. Strong efflux noted from bilateral ureteral orifices. INDICATION FOR PROCEDURE: 45y.o. year old patient who presented with symptomatic pelvic organ prolapse and stress incontinence. She had previously had a hysterectomy. On exam she had stage 3 prolapse. She desired to proceed with appropriate surgical repairs.   She was consented to the risks, including bleeding, risk of blood transfusion, infection, injury to surrounding organs such as bowel, bladder, ureters, urethra, the risk of postoperative voiding dysfunction or defecatory dysfunction, risk of nerve injury or re-exploration, the risk of recurrent prolapse or incontinence, and the risk of mesh erosion . The patient understood all of these risks and desired to proceed. OPERATIVE NOTE:   The patient was taken to the operating room and general anesthesia was found to be adequate. She was prepped and draped in the normal sterile fashion and placed in [address] [place] [address]. Preoperative antibiotics were administered in the patient holding area. The bladder was drained with a Aguilar catheter. DISSECTION: The most distal portion of the prolapse was identified. An allis clamp was placed midline at this point followed by other clamps in the midline moving cephalad. The vaginal wall was then injected with 1% lidocaine with epinephrine along the midline of the prolapse. A knife was used to make an incision in the vaginal mucosa. Clamps were placed along this incision bilaterally and the mucosa was then dissected off the fibromuscular layer. The rectum, enterocele and bladder were carefully identified during the dissection. The enterocele sac was carefully identified and sharply entered. There was an adhesion of the omentum to the cul-de-sac and this was taken down with the bovie. The Nely retractor was placed anteriorly. VAGINAL VAULT SUSPENSION: A moist laparotomy sponge was then placed in the cul-de-sac. The uterosacral ligaments were palpated on both sides and Allis clamps were placed on the ligaments. Two sutures of 2-0 PDS were placed through the each uterosacral ligament and tagged to be later brought through the vaginal mucosa. The laparotomy sponges were removed and the preliminary sponge count was correct. ENTEROCELE REPAIR:  The enterocele was identified and the proximal end of the dissected fibromuscular fascia was pulled cephalad by passing the inferior vault sutures through the proximal end of the fibromuscular tissue and tying them down.   Two suture of 0 Prolene was reefed from one uterosacral ligament to the other across the posterior aspect of the peritoneum and then tied in order to create a firm ridge to obliterate this hernia. The vault sutures were brought through the vaginal mucosa into the vagina and they were tied down with excellent elevation of the vaginal apex. RETROPUBIC SUBURETHRAL SLING: The bladder was completely drained using the Aguilar catheter. Using Allis clamps, the epithelium under the urethra was grasped and injected with 1% lidocaine with epinephrine. An incision was made with the scalpel, and the lateral edges grasped with Allis clamps. Two tunnels were developed bilaterally until the pubic rami were palpated. Areas on the skin were marked 2 cm lateral to the midline above the symphysis pubis. These areas were injected with 1% lidocaine with epinephrine and incisions were made with a scalpel over the marks. The trocars were then placed bilaterally and brought through the vaginal tunnel with one finger in the vaginal tunnel to guide the trocars. CYSTOURETHROSCOPY: A cystourethroscopy was performed with the trocars in place to confirm no injury to the bladder or urethra. Both ureters were ntoed to have strong efflux. The mesh was attached to the trocars and brought through the tunnels. The suburethral mesh was placed tension-free in the midurethral segment. The mesh was tensioned using a right angle clamp and Crede maneuver. The plastic sheath was removed. The sling was cut off at the skin. The skin was closed with dermabond. The vaginal incision was closed with 3-0 Vicryl in a running, locked fashion. RECTOCELE REPAIR AND PERINEORRHAPHY:  At t this time, after injection with the lidocaine mixture, a taylor-shaped wedge of tissue was taken from the posterior perineum and vagina after two Allis clamps were placed on the posterior forchette in the appropriate position to ensure that the vaginal introitus was the appropriate size.   With one finger in the rectum, the rectocele was then dissected free of the vaginal mucosa by  means of sharp dissection. The rectocele was then plicated using 2-0 Vicryl suture in an interrupted fashion. The posterior vaginal mucosa was then closed using 3-0 Vicryl in a running fashion and the perineum closed in a subcuticular fashion. The tails of the vaginal vault sutures were trimmed in the vagina. Iodiform vaginal packing was placed in the vagina and the Aguilar catheter was left in place. Sponge, lap and needle counts were correct x 2. There were no complications. The patient tolerated the procedure well and was taken to the recovery room in stable condition.         Electronically signed by Nehemias Chairez MD on 5/2/2022 at 1:18 PM

## 2022-05-02 NOTE — ANESTHESIA POSTPROCEDURE EVALUATION
Department of Anesthesiology  Postprocedure Note    Patient: Marlen Mcgrath  MRN: [id number]  YOB: 1970  Date of evaluation: 5/2/2022  Time:  4:34 PM     Procedure Summary     Date: 05/02/22 Room / Location:  [facility] [address] [place] [address]    Anesthesia Start: 1136 Anesthesia Stop: 1335    Procedures:       VAGINAL VAULT SUSPENSION (N/A Vagina )      POSTERIOR REPAIR (N/A Vagina )      RETROPUBIC SLING (N/A Vagina )      CYSTOSCOPY (N/A Ureter) Diagnosis:       Prolapse of vaginal vault after hysterectomy      Cystocele, midline      Rectocele      Vaginal enterocele, congenital or acquired      Genuine stress incontinence, female      (VAGINAL VAULT PROLAPSE, CYSTOCELE, RECTOCELE, ENTEROCELE, GENUINE STRESS INCONTINENCE)    Surgeons: Herminio Castro MD Responsible Provider: Mikey Lewis MD    Anesthesia Type: general ASA Status: 3          Anesthesia Type: general    Belgica Phase I: Belgica Score: 8    Belgica Phase II:      Last vitals: Reviewed and per EMR flowsheets. Anesthesia Post Evaluation    Patient location during evaluation: PACU  Patient participation: complete - patient participated  Level of consciousness: awake and alert  Airway patency: patent  Nausea & Vomiting: no nausea and no vomiting  Complications: no  Cardiovascular status: hemodynamically stable and blood pressure returned to baseline  Respiratory status: spontaneous ventilation and nonlabored ventilation  Hydration status: stable  Comments: Ms. Pema Spaulding was seen resting comfortably following procedure.

## 2022-05-02 NOTE — PROGRESS NOTES
Spoke with MD Demetria Mcnair, updated on oozing at right lower site. May place bandaid on site and monitor.

## 2022-05-02 NOTE — PROGRESS NOTES
Vaginal Sweep Documentation     Vaginal prep sponge count performed by BG and TM. Count correct. Vaginal sweep performed by Dr Henrry Solis  at 9403. No foreign objects or vaginal tears noted.

## 2022-05-02 NOTE — ANESTHESIA PRE PROCEDURE
Department of Anesthesiology  Preprocedure Note       Name:  Pernell Cantu   Age:  45 y.o.  :  1970                                          MRN:  [id number]         Date:  2022      Surgeon: Maxi Thibodeaux):  Melinda Greenwood MD    Procedure: Procedure(s):  VAGINAL VAULT SUSPENSION  ANTERIOR AND POSTERIOR REPAIR  RETROPUBIC SLING  CYSTOSCOPY    Medications prior to admission:   Prior to Admission medications    Medication Sig Start Date End Date Taking?  Authorizing Provider   albuterol sulfate  (90 Base) MCG/ACT inhaler Inhale 2 puffs into the lungs every 6 hours as needed 21   Historical Provider, MD   fluticasone (FLONASE) 50 MCG/ACT nasal spray 2 sprays by Nasal route as needed  11/15/21   Historical Provider, MD   ondansetron (ZOFRAN-ODT) 4 MG disintegrating tablet Take 4 mg by mouth every 6 hours as needed 21   Historical Provider, MD   busPIRone (BUSPAR) 15 MG tablet Take 15 mg by mouth 2 times daily    Historical Provider, MD   lithium (ESKALITH) 450 MG extended release tablet Take 450 mg by mouth 2 times daily    Historical Provider, MD   montelukast (SINGULAIR) 10 MG tablet Take 10 mg by mouth nightly    Historical Provider, MD   topiramate (TOPAMAX) 100 MG tablet Take 100 mg by mouth 2 times daily    Historical Provider, MD   traZODone (DESYREL) 100 MG tablet Take 100 mg by mouth as needed     Historical Provider, MD   loratadine (CLARITIN) 10 MG capsule Take 10 mg by mouth daily    Historical Provider, MD   nortriptyline (PAMELOR) 25 MG capsule Take 25 mg by mouth nightly    Historical Provider, MD   omeprazole (PRILOSEC) 20 MG capsule Take 40 mg by mouth in the morning and at bedtime     Historical Provider, MD       Current medications:    Current Facility-Administered Medications   Medication Dose Route Frequency Provider Last Rate Last Admin    0.9 % sodium chloride infusion   IntraVENous Continuous Ricarda Lynne MD        sodium chloride flush 0.9 % injection 5-40 mL  5-40 mL IntraVENous 2 times per day Oralia Multani MD        sodium chloride flush 0.9 % injection 5-40 mL  5-40 mL IntraVENous PRN Oralia Multani MD        0.9 % sodium chloride infusion   IntraVENous PRN Oralia Multani MD        ceFAZolin (ANCEF) 2000 mg in dextrose 5 % 100 mL IVPB  2,000 mg IntraVENous On Call to Mango Cheng MD        enoxaparin (LOVENOX) injection 40 mg  40 mg SubCUTAneous Once Debbie Ng MD           Allergies:     Allergies   Allergen Reactions    Penicillins Hives    Adhesive Tape Hives and Rash    Lidocaine Rash     Allergy is to the adhesive, not the lidocaine by pt report    Sulfa Antibiotics Hives and Rash       Problem List:    Patient Active Problem List   Diagnosis Code    Lumbosacral radiculopathy M54.17    Anxiety F41.9    ADHD (attention deficit hyperactivity disorder) F90.9    COPD (chronic obstructive pulmonary disease) ([facility]) J44.9    Tobacco abuse Z72.0    Acute bronchitis due to other specified organisms J20.8    Allergy T78.40XA    Amblyopia H53.009    Borderline personality disorder ([facility] [address] F60.3    Bowel incontinence R15.9    Chronic pain G89.29    Chronic rhinitis J31.0    DDD (degenerative disc disease), lumbar M51.36    Depressive disorder F32.9    Displacement of lumbar intervertebral disc without myelopathy M51.26    Essential hypertension I10    Solares's esophagus with dysplasia K22.719    Hyperlipidemia E78.5    Insomnia G47.00    Leucocytosis D72.829    Loss of weight R63.4    Migraine G43.909    Moderate mixed bipolar I disorder ([facility]) F31.62    Moderate persistent asthma J45.40    Other malaise and fatigue R53.81, R53.83    Primary osteoarthritis of left knee M17.12    PTSD (post-traumatic stress disorder) F43.10    Rotator cuff syndrome, right M75.101    Schizophrenia ([facility] [address] F20.9    Sciatica M54.30    Asthma J45.909    JESSICA (obstructive sleep apnea) G47.33    Tobacco abuse, in remission F17.201       Past Medical History:        Diagnosis Date    Apnea, sleep     patient stated she does not use cpap    Arthritis     Solares's esophageal ulceration     Bipolar 1 disorder (HCC)     COPD (chronic obstructive pulmonary disease) (HCC)     Depression     GERD (gastroesophageal reflux disease)        Past Surgical History:        Procedure Laterality Date    COLONOSCOPY      ENDOSCOPY, COLON, DIAGNOSTIC      HYSTERECTOMY      JOINT REPLACEMENT Bilateral     knee replacement    TONSILLECTOMY      TUBAL LIGATION         Social History:    Social History     Tobacco Use    Smoking status: Current Every Day Smoker     Packs/day: 0.50     Years: 30.00     Pack years: 15.00     Types: Cigarettes    Smokeless tobacco: Never Used   Substance Use Topics    Alcohol use: No                                Ready to quit: Not Answered  Counseling given: Not Answered      Vital Signs (Current):   Vitals:    04/19/22 0856   Weight: 173 lb (78.5 kg)   Height: 5' 1\" (1.549 m)                                              BP Readings from Last 3 Encounters:   03/28/22 116/82   02/08/22 134/84   12/28/21 (!) 116/100       NPO Status: Time of last liquid consumption: 2200                        Time of last solid consumption: 2000                        Date of last liquid consumption: 05/01/22                        Date of last solid food consumption: 05/01/22    BMI:   Wt Readings from Last 3 Encounters:   04/19/22 173 lb (78.5 kg)   02/29/16 178 lb (80.7 kg)   08/04/14 158 lb (71.7 kg)     Body mass index is 32.69 kg/m².     CBC:   Lab Results   Component Value Date    WBC 9.4 09/10/2014    RBC 5.09 09/10/2014    HGB 15.2 09/10/2014    HCT 45.4 09/10/2014    MCV 89.1 09/10/2014    RDW 13.4 09/10/2014     09/10/2014       CMP:   Lab Results   Component Value Date     09/10/2014    K 3.9 09/10/2014     09/10/2014    CO2 30 09/10/2014    BUN 10 09/10/2014    GFRAA > 60 09/10/2014    AGRATIO 1.0 09/10/2014    LABGLOM > 60 09/10/2014    GLUCOSE 83 09/10/2014    CALCIUM 9.2 09/10/2014    BILITOT 0.2 09/10/2014    ALKPHOS 73 09/10/2014    AST 30 09/10/2014    ALT 52 09/10/2014       POC Tests: No results for input(s): POCGLU, POCNA, POCK, POCCL, POCBUN, POCHEMO, POCHCT in the last 72 hours. Coags: No results found for: PROTIME, INR, APTT    HCG (If Applicable): No results found for: PREGTESTUR, PREGSERUM, HCG, HCGQUANT     ABGs: No results found for: PHART, PO2ART, SDF6ELJ, TFP7PTH, BEART, P6UUCAGT     Type & Screen (If Applicable):  No results found for: LABABO, LABRH    Drug/Infectious Status (If Applicable):  No results found for: HIV, HEPCAB    COVID-19 Screening (If Applicable): No results found for: COVID19        Anesthesia Evaluation    Airway: Mallampati: II  TM distance: >3 FB   Neck ROM: full  Mouth opening: > = 3 FB Dental:    (+) upper dentures and edentulous      Pulmonary:   (+) COPD:  sleep apnea:  asthma: current smoker          Patient smoked on day of surgery. Cardiovascular:  Exercise tolerance: good (>4 METS),   (+) hypertension:, hyperlipidemia    (-) pacemaker, valvular problems/murmurs, past MI, CAD, CABG/stent, dysrhythmias, orthopnea and no pulmonary hypertension      Rhythm: regular                      Neuro/Psych:   (+) neuromuscular disease:, headaches:, psychiatric history:   (-) depression/anxiety            GI/Hepatic/Renal:   (+) GERD:, PUD,      (-) hepatitis, liver disease, no renal disease, bowel prep and no morbid obesity      ROS comment: Barretts esophagus. Endo/Other:    (+) no malignancy/cancer. (-) diabetes mellitus, hypothyroidism, hyperthyroidism, blood dyscrasia, arthritis, no electrolyte abnormalities, no malignancy/cancer               Abdominal:             Vascular: negative vascular ROS. Other Findings:             Anesthesia Plan      general     ASA 3       Induction: intravenous.     MIPS: Postoperative opioids intended, Prophylactic antiemetics administered and Postoperative trial extubation. Anesthetic plan and risks discussed with patient. Plan discussed with CRNA. Amaury Cavanaugh MD   5/2/2022      This pre-anesthesia assessment may be used as a history and physical.    DOS STAFF ADDENDUM:    Pt seen and examined, chart reviewed (including anesthesia, drug and allergy history). No interval changes to history and physical examination. Anesthetic plan, risks, benefits, alternatives, and personnel involved discussed with patient. Patient verbalized an understanding and agrees to proceed.       Amaury Cavanaugh MD  May 2, 2022  11:11 AM

## 2022-05-03 VITALS
RESPIRATION RATE: 16 BRPM | WEIGHT: 187.17 LBS | BODY MASS INDEX: 35.34 KG/M2 | SYSTOLIC BLOOD PRESSURE: 102 MMHG | TEMPERATURE: 98.1 F | OXYGEN SATURATION: 97 % | DIASTOLIC BLOOD PRESSURE: 71 MMHG | HEIGHT: 61 IN | HEART RATE: 86 BPM

## 2022-05-03 PROBLEM — N81.6 FEMALE RECTOCELE WITH ENTEROCELE: Status: RESOLVED | Noted: 2022-05-02 | Resolved: 2022-05-03

## 2022-05-03 PROBLEM — K46.9 FEMALE RECTOCELE WITH ENTEROCELE: Status: RESOLVED | Noted: 2022-05-02 | Resolved: 2022-05-03

## 2022-05-03 PROBLEM — N81.9 FEMALE GENITAL PROLAPSE: Status: RESOLVED | Noted: 2022-05-02 | Resolved: 2022-05-03

## 2022-05-03 PROCEDURE — 6370000000 HC RX 637 (ALT 250 FOR IP): Performed by: OBSTETRICS & GYNECOLOGY

## 2022-05-03 PROCEDURE — G0378 HOSPITAL OBSERVATION PER HR: HCPCS

## 2022-05-03 PROCEDURE — 6360000002 HC RX W HCPCS: Performed by: OBSTETRICS & GYNECOLOGY

## 2022-05-03 PROCEDURE — 94760 N-INVAS EAR/PLS OXIMETRY 1: CPT

## 2022-05-03 PROCEDURE — 2580000003 HC RX 258: Performed by: OBSTETRICS & GYNECOLOGY

## 2022-05-03 RX ORDER — IBUPROFEN 600 MG/1
600 TABLET ORAL 3 TIMES DAILY
Qty: 30 TABLET | Refills: 1 | Status: SHIPPED | OUTPATIENT
Start: 2022-05-03 | End: 2022-05-10

## 2022-05-03 RX ORDER — PSYLLIUM SEED (WITH DEXTROSE)
1 POWDER (GRAM) ORAL 2 TIMES DAILY
Qty: 538 G | Refills: 1 | Status: SHIPPED | OUTPATIENT
Start: 2022-05-03 | End: 2022-06-14

## 2022-05-03 RX ORDER — DOCUSATE SODIUM 100 MG/1
100 CAPSULE, LIQUID FILLED ORAL 2 TIMES DAILY
Qty: 60 CAPSULE | Refills: 0 | Status: SHIPPED | OUTPATIENT
Start: 2022-05-03 | End: 2022-06-02

## 2022-05-03 RX ORDER — OXYCODONE HYDROCHLORIDE 5 MG/1
5 TABLET ORAL EVERY 6 HOURS PRN
Qty: 20 TABLET | Refills: 0 | Status: SHIPPED | OUTPATIENT
Start: 2022-05-03 | End: 2022-05-08

## 2022-05-03 RX ADMIN — HYDROMORPHONE HYDROCHLORIDE 0.5 MG: 1 INJECTION, SOLUTION INTRAMUSCULAR; INTRAVENOUS; SUBCUTANEOUS at 11:23

## 2022-05-03 RX ADMIN — BUSPIRONE HYDROCHLORIDE 15 MG: 15 TABLET ORAL at 09:36

## 2022-05-03 RX ADMIN — LITHIUM CARBONATE 450 MG: 450 TABLET, EXTENDED RELEASE ORAL at 09:36

## 2022-05-03 RX ADMIN — OXYCODONE HYDROCHLORIDE 10 MG: 10 TABLET ORAL at 14:50

## 2022-05-03 RX ADMIN — CETIRIZINE HYDROCHLORIDE 5 MG: 10 TABLET, FILM COATED ORAL at 09:36

## 2022-05-03 RX ADMIN — OXYCODONE HYDROCHLORIDE 10 MG: 10 TABLET ORAL at 05:54

## 2022-05-03 RX ADMIN — SODIUM CHLORIDE, PRESERVATIVE FREE 10 ML: 5 INJECTION INTRAVENOUS at 11:39

## 2022-05-03 RX ADMIN — SODIUM CHLORIDE: 9 INJECTION, SOLUTION INTRAVENOUS at 02:22

## 2022-05-03 RX ADMIN — OXYCODONE HYDROCHLORIDE 10 MG: 10 TABLET ORAL at 09:39

## 2022-05-03 RX ADMIN — PANTOPRAZOLE SODIUM 40 MG: 40 TABLET, DELAYED RELEASE ORAL at 05:54

## 2022-05-03 ASSESSMENT — PAIN - FUNCTIONAL ASSESSMENT: PAIN_FUNCTIONAL_ASSESSMENT: PREVENTS OR INTERFERES SOME ACTIVE ACTIVITIES AND ADLS

## 2022-05-03 ASSESSMENT — PAIN DESCRIPTION - ONSET: ONSET: ON-GOING

## 2022-05-03 ASSESSMENT — PAIN DESCRIPTION - DESCRIPTORS: DESCRIPTORS: ACHING;DISCOMFORT

## 2022-05-03 ASSESSMENT — PAIN DESCRIPTION - ORIENTATION: ORIENTATION: INNER

## 2022-05-03 ASSESSMENT — PAIN DESCRIPTION - LOCATION: LOCATION: VAGINA;PERINEUM

## 2022-05-03 ASSESSMENT — PAIN DESCRIPTION - FREQUENCY: FREQUENCY: CONTINUOUS

## 2022-05-03 ASSESSMENT — PAIN SCALES - GENERAL
PAINLEVEL_OUTOF10: 4
PAINLEVEL_OUTOF10: 7
PAINLEVEL_OUTOF10: 8
PAINLEVEL_OUTOF10: 8
PAINLEVEL_OUTOF10: 7
PAINLEVEL_OUTOF10: 5

## 2022-05-03 ASSESSMENT — PAIN DESCRIPTION - PAIN TYPE: TYPE: SURGICAL PAIN

## 2022-05-03 ASSESSMENT — PAIN SCALES - WONG BAKER
WONGBAKER_NUMERICALRESPONSE: 6
WONGBAKER_NUMERICALRESPONSE: 6

## 2022-05-03 NOTE — PROGRESS NOTES
Patient alert and oriented x4. Pain medicated per MD orders. Patient voiding trial began at 1047 per MD order. 300ml sterile water instilled into bladder. Walter removed. Patient assisted to the toilet. After 20 minutes patient did not void. Walter catheter replaced and after bladder was drained, walter was capped and patient instructed to drain as she feels the urge to urinate but not to wait any longer than 6 hours to drain. Patient verbalized understanding. Will continue to monitor until discharge later today.

## 2022-05-03 NOTE — PLAN OF CARE
Problem: Discharge Planning  Goal: Discharge to home or other facility with appropriate resources  5/3/2022 1100 by Boubacar Kirkpatrick RN  Outcome: Progressing     Problem: ABCDS Injury Assessment  Goal: Absence of physical injury  5/3/2022 1100 by Boubacar Kirkpatrick RN  Outcome: Progressing     Problem: Pain  Goal: Verbalizes/displays adequate comfort level or baseline comfort level  5/3/2022 1100 by Boubacar Kirkpatrick RN  Outcome: Progressing     Problem: Safety - Adult  Goal: Free from fall injury  5/3/2022 1100 by Boubacar Kirkpatrick RN  Outcome: Progressing     Problem: Skin/Tissue Integrity  Goal: Absence of new skin breakdown  Description: 1. Monitor for areas of redness and/or skin breakdown  2. Assess vascular access sites hourly  3. Every 4-6 hours minimum:  Change oxygen saturation probe site  4. Every 4-6 hours:  If on nasal continuous positive airway pressure, respiratory therapy assess nares and determine need for appliance change or resting period.   5/3/2022 1100 by Boubacar Kirkpatrick RN  Outcome: Progressing     Problem: Skin/Tissue Integrity - Adult  Goal: Skin integrity remains intact  Outcome: Progressing     Problem: Skin/Tissue Integrity - Adult  Goal: Incisions, wounds, or drain sites healing without S/S of infection  Outcome: Progressing     Problem: Skin/Tissue Integrity - Adult  Goal: Oral mucous membranes remain intact  Outcome: Progressing     Problem: Musculoskeletal - Adult  Goal: Return mobility to safest level of function  Outcome: Progressing     Problem: Musculoskeletal - Adult  Goal: Maintain proper alignment of affected body part  Outcome: Progressing     Problem: Musculoskeletal - Adult  Goal: Return ADL status to a safe level of function  Outcome: Progressing     Problem: Genitourinary - Adult  Goal: Absence of urinary retention  Outcome: Progressing     Problem: Genitourinary - Adult  Goal: Urinary catheter remains patent  Outcome: Progressing

## 2022-05-03 NOTE — PROGRESS NOTES
Vaginal packing removed , no signs of bleeding noted. Sudha care and walter care done. Patient tolerated well.  Electronically signed by Shaniqua Encinas RN on 5/3/2022 at 6:08 AM

## 2022-05-03 NOTE — DISCHARGE SUMMARY
Hospital Discharge Summary      Patient ID: La Nena Meeks      Patient's PCP: Peña Diaz MD    Admit Date: 2022     Discharge Date: 5/3/2022  The patient was seen and examined on day of discharge and this discharge summary is in conjunction with any daily progress note from day of discharge. Admitting Physician: Joya Hernadez MD    Discharge Physician: JESUS ALBERTO Jones CNP     Admitted for No chief complaint on file. Admitting Diagnosis Prolapse of vaginal vault after hysterectomy [N99.3]  Cystocele, midline [N81.11]  Rectocele [N81.6]  Vaginal enterocele, congenital or acquired [N81.5]  Genuine stress incontinence, female [N39.3]  Female rectocele with enterocele [N81.6, K46.9]  Female genital prolapse [N81.9]    Discharge Diagnoses: There are no active hospital problems to display for this patient. Hospital Course:   Post-op day 1, tolerating regular diet. Vaginal packing removed. Awaiting voiding trial. VSS      Consults:     None        Disposition: home    Discharged Condition: Stable    Code Status: Full Code    Activity: no lifting, Driving, or Strenuous exercise for 2 weeks. Diet: regular diet      Wound Care: none needed    SUBJECTIVE / Interval History:   Discussed pain management, medications to take at home and frequency of medications. Discussed activity restrictions including no lifting heavier than 10 lbs, no pushing, pulling, bending or activity for exercise. Explained bowel regimen and need to keep BMs soft to avoid pressure and straining. Patient verbalized understanding. Explained that there was a chance she could go home with catheter in place and if she went home with it she would return to the office in 1 week, otherwise she would follow-up in 2 weeks. Exam:  TEMPERATURE:  Current - Temp: 98 °F (36.7 °C);  Max - Temp  Av.5 °F (35.8 °C)  Min: 83.5 °F (28.6 °C)  Max: 98.9 °F (37.2 °C)  RESPIRATIONS RANGE: Resp  Av.2  Min: 1  Max: 25  PULSE RANGE: Pulse  Av.9  Min: 74  Max: 93  BLOOD PRESSURE RANGE:  Systolic (79NIG), FJF:84 , Min:76 , VLE:053   ; Diastolic (58FLK), AOT:51, Min:51, Max:73    PULSE OXIMETRY RANGE: SpO2  Av %  Min: 87 %  Max: 100 %  24HR INTAKE/OUTPUT:    Intake/Output Summary (Last 24 hours) at 5/3/2022 9405  Last data filed at 5/3/2022 8146  Gross per 24 hour   Intake 2977.47 ml   Output 790 ml   Net 2187.47 ml      Wt Readings from Last 1 Encounters:   22 187 lb 2.7 oz (84.9 kg)     BMI Readings from Last 1 Encounters:   22 35.37 kg/m²       General appearance: No apparent distress, appears stated age and cooperative. HEENT Normal cephalic, atraumatic without obvious deformity. Pupils equal, round, and reactive to light. Extra ocular muscles intact. Conjunctivae/corneas clear. Neck: Supple, No jugular venous distention/bruits. Trachea midline without thyromegaly or adenopathy with full range of motion. Lungs: Clear to ascultation, bilaterally without Rales/Wheezes/Rhonchi with good respiratory effort. Heart: Regular rate and rhythm with Normal S1/S2 without  murmurs, rubs or gallops, point of maximum impulse non-displaced  Abdomen: Soft, non-tender or non-distended without rigidity or guarding and positive bowel sounds all four quadrants. Extremities: No clubbing, cyanosis, or edema bilaterally. Full range of motion without deformity and normal gait intact. Skin: Skin color, texture, turgor normal.  No rashes or lesions. Neurologic: Alert and oriented X 3,  neurovascularly intact with sensory/motor intact upper extremities/lower extremities, bilaterally. Cranial nerves:II-XII intact, grossly non-focal.  Mental status: Alert, oriented, thought content appropriate      Labs:  For convenience and continuity at follow-up the following most recent labs are provided:    CBC:   Lab Results   Component Value Date    WBC 9.4 09/10/2014    HGB 15.2 09/10/2014    HCT 45.4 09/10/2014     09/10/2014 RENAL:   Lab Results   Component Value Date     09/10/2014    K 3.9 09/10/2014     09/10/2014    CO2 30 09/10/2014    BUN 10 09/10/2014           Discharge Medications:      Medication List      START taking these medications    docusate sodium 100 MG capsule  Commonly known as: COLACE  Take 1 capsule by mouth 2 times daily     ibuprofen 600 MG tablet  Commonly known as: ADVIL;MOTRIN  Take 1 tablet by mouth three times daily for 7 days     Metamucil 48.57 % Powd  Generic drug: Psyllium  Take 1 Scoop by mouth 2 times daily     oxyCODONE 5 MG immediate release tablet  Commonly known as: ROXICODONE  Take 1 tablet by mouth every 6 hours as needed for Pain for up to 5 days. CONTINUE taking these medications    albuterol sulfate  (90 Base) MCG/ACT inhaler     busPIRone 15 MG tablet  Commonly known as: BUSPAR     fluticasone 50 MCG/ACT nasal spray  Commonly known as: FLONASE     lithium 450 MG extended release tablet  Commonly known as: ESKALITH     loratadine 10 MG capsule  Commonly known as: CLARITIN     montelukast 10 MG tablet  Commonly known as: SINGULAIR     nortriptyline 25 MG capsule  Commonly known as: PAMELOR     omeprazole 20 MG delayed release capsule  Commonly known as: PRILOSEC     ondansetron 4 MG disintegrating tablet  Commonly known as: ZOFRAN-ODT     traZODone 50 MG tablet  Commonly known as: DESYREL           Where to Get Your Medications      You can get these medications from any pharmacy    Bring a paper prescription for each of these medications  · docusate sodium 100 MG capsule  · ibuprofen 600 MG tablet  · Metamucil 48.57 % Powd  · oxyCODONE 5 MG immediate release tablet         Future Appointments   Date Time Provider Malcom Dietrich   5/16/2022 11:00 AM Juan Marks, APRN - CNP [facility]      No follow-ups on file.       Time Spent on discharge is more than 20 minutes in the examination, evaluation, counseling and review of medications and discharge plan.      Signed:  JESUS ALBERTO Xiao CNP   5/3/2022    The note was completed using EMR. Every effort was made to ensure accuracy; however, inadvertent computerized transcription errors may be present.

## 2022-05-03 NOTE — PROGRESS NOTES
Patient discharged per MD order. IV removed intact. Patient and all belongings transported to [facility] via wheelchair, where she was transported home by son.

## 2022-05-03 NOTE — PLAN OF CARE
Problem: Pain  Goal: Verbalizes/displays adequate comfort level or baseline comfort level  Outcome: Progressing    Pain/discomfort being managed with PRN analgesics per MD orders. Pt able to express presence and absence of pain and rate pain appropriately using numerical scale. Non-pharmacologic comfort measures implemented. Rest and comfort promoted. Problem: Safety - Adult  Goal: Free from fall injury  Outcome: Progressing    Patient uses call light appropriately to express needs. Bed to lowest position with door open and call light in reach. All fall precautions implemented at this time. Bed alarm on for safety. Siderails up x2. Non skid footwear in place. Seen at intervals to ensure safety. All needs attended. Problem: Skin/Tissue Integrity  Goal: Absence of new skin breakdown  Description: 1. Monitor for areas of redness and/or skin breakdown  2. Assess vascular access sites hourly  3. Every 4-6 hours minimum:  Change oxygen saturation probe site  4. Every 4-6 hours:  If on nasal continuous positive airway pressure, respiratory therapy assess nares and determine need for appliance change or resting period.   Outcome: Progressing     Problem: Discharge Planning  Goal: Discharge to home or other facility with appropriate resources  Outcome: Progressing     Problem: ABCDS Injury Assessment  Goal: Absence of physical injury  Outcome: Progressing

## 2022-05-03 NOTE — CARE COORDINATION
INITIAL CASE MANAGEMENT ASSESSMENT     Reviewed chart, met with patient to assess possible discharge needs. Explained Case Management role/services. SW met with patient and son at bedside today. Son appeared to be quietly resting. Patient gave this writer permission to speak freely in front of family. Living Situation: Patient resides in a duplex home alone with one cat and one dog. She stated that her son will be with her to assist in the recovery process. The home is entry level. Prior to medical admission patient stated that she had no trouble getting in and out of the property.      ADLs: Prior to medical admission patient reports that she received assistance with laundry only from her boyfriend and/or son if needed. She stated that she doesn't anticipate any needs at discharge.     DME: Prior to medical admission patient reports that she had a walker but did not use it. She stated that she doesn't anticipate any needs at discharge.      PT/OT Recs: Not ordered.      Active Services: Prior to medical admission patient reports that she had no active services in place. She stated that she doesn't anticipate any needs at discharge.      Transportation: Prior to medical admission patient reports that she was an active . She stated that her son will likely transport her home at discharge.      Medications: Patient receives long term medications from [facility] and short term medications from [facility] on [address]. At this time there are no issues with access or affordability.     PCP: Edmund Jaeger [phone] (phone) and [phone] (fax number). Patient reports that her last appointment with this provider was over one month ago.  Varun Becker  HD/PD: N/A     PLAN/COMMENTS:   1) Post surgery clearance including advanced diet toleration and pain management. 2) Discharge to home with family.      SW provided contact information for patient or family to call with any questions.   BELIA will follow and assist as needed.     Respectfully submitted,     Christina [email or web address]  [facility]   [phone]     Electronically signed by ROSARIO Zavala on 5/3/2022 at 10:06 AM

## 2022-05-06 ENCOUNTER — TELEPHONE (OUTPATIENT)
Dept: UROGYNECOLOGY | Age: 52
End: 2022-05-06

## 2022-05-06 NOTE — TELEPHONE ENCOUNTER
Pt doing ok postop, concerned about her feet swelling last night. I asked her to elevated them, drink lots of water to flush, and avoid sodium. Getting up and moving around should help as well. She has a little blood in her urine, she does have a catheter that is plugged. Bowels are moving, too loosely. Asked her to hold the miralax for a day or so. Continue colace, and motrin, and to call if she needs anything. We will see her Monday .

## 2022-05-09 ENCOUNTER — OFFICE VISIT (OUTPATIENT)
Dept: UROGYNECOLOGY | Age: 52
End: 2022-05-09
Payer: COMMERCIAL

## 2022-05-09 VITALS
RESPIRATION RATE: 16 BRPM | HEART RATE: 91 BPM | OXYGEN SATURATION: 98 % | SYSTOLIC BLOOD PRESSURE: 121 MMHG | DIASTOLIC BLOOD PRESSURE: 77 MMHG | TEMPERATURE: 98.9 F

## 2022-05-09 DIAGNOSIS — R39.15 URINARY URGENCY: ICD-10-CM

## 2022-05-09 DIAGNOSIS — N39.0 ACUTE UTI: ICD-10-CM

## 2022-05-09 DIAGNOSIS — Z09 POSTOP CHECK: Primary | ICD-10-CM

## 2022-05-09 LAB
BILIRUBIN, POC: NORMAL
BLOOD URINE, POC: NORMAL
CLARITY, POC: NORMAL
COLOR, POC: YELLOW
GLUCOSE URINE, POC: NORMAL
KETONES, POC: NORMAL
LEUKOCYTE EST, POC: NORMAL
NITRITE, POC: NORMAL
PH, POC: 6.5
PROTEIN, POC: NORMAL
SPECIFIC GRAVITY, POC: 1.01
UROBILINOGEN, POC: NORMAL

## 2022-05-09 PROCEDURE — 99024 POSTOP FOLLOW-UP VISIT: CPT | Performed by: NURSE PRACTITIONER

## 2022-05-09 PROCEDURE — 81002 URINALYSIS NONAUTO W/O SCOPE: CPT | Performed by: NURSE PRACTITIONER

## 2022-05-09 PROCEDURE — 51701 INSERT BLADDER CATHETER: CPT | Performed by: NURSE PRACTITIONER

## 2022-05-09 RX ORDER — NITROFURANTOIN 25; 75 MG/1; MG/1
100 CAPSULE ORAL 2 TIMES DAILY
Qty: 14 CAPSULE | Refills: 0 | Status: SHIPPED | OUTPATIENT
Start: 2022-05-09 | End: 2022-05-16

## 2022-05-09 NOTE — PROGRESS NOTES
5/9/2022       HPI:     Name: Nikolay Reynoso  YOB: 1970    CC: Nikolay Reynoso is a 45 y.o. female who is here for a post op evaluation. HPI: Recently underwent VAGINAL VAULT SUSPENSION, POSTERIOR REPAIR, RETROPUBIC SLING and CYSTOSCOPY on 5/2/22. She is here for a voiding trial.   Bowel functions: normal   Pain Controlled: No per patient. Having cramping.     Past Medical History:   Past Medical History:   Diagnosis Date    Apnea, sleep     patient stated she does not use cpap    Arthritis     Solares's esophageal ulceration     Bipolar 1 disorder (HCC)     [facility] ([facility]     Depression     GERD (gastroesophageal reflux disease)      Past Surgical History:   Past Surgical History:   Procedure Laterality Date    COLONOSCOPY      CYSTOSCOPY N/A 5/2/2022    CYSTOSCOPY performed by Petey Avila MD at [facility], DIAGNOSTIC      HYSTERECTOMY      JOINT REPLACEMENT Bilateral     knee replacement    TONSILLECTOMY      TUBAL LIGATION      URETHRAL SURGERY N/A 5/2/2022    RETROPUBIC SLING performed by Petey Avila MD at [address] [place] [address] N/A 5/2/2022    VAGINAL VAULT SUSPENSION performed by Petey Avila MD at [address] [place] [address] N/A 5/2/2022    POSTERIOR REPAIR performed by Petey Avila MD at [place] [address]     Current Medications:  Current Outpatient Medications   Medication Sig Dispense Refill    nitrofurantoin, macrocrystal-monohydrate, (MACROBID) 100 MG capsule Take 1 capsule by mouth 2 times daily for 7 days 14 capsule 0    ibuprofen (ADVIL;MOTRIN) 600 MG tablet Take 1 tablet by mouth three times daily for 7 days 30 tablet 1    Psyllium (METAMUCIL) 48.57 % POWD Take 1 Scoop by mouth 2 times daily 538 g 1    docusate sodium (COLACE) 100 MG capsule Take 1 capsule by mouth 2 times daily 60 capsule 0    nortriptyline (PAMELOR) 25 MG capsule Take 50 mg by mouth nightly      traZODone (DESYREL) 50 MG tablet Take 50 mg by mouth nightly as needed for Sleep      albuterol sulfate  (90 Base) MCG/ACT inhaler Inhale 2 puffs into the lungs every 6 hours as needed      fluticasone (FLONASE) 50 MCG/ACT nasal spray 2 sprays by Nasal route as needed       ondansetron (ZOFRAN-ODT) 4 MG disintegrating tablet Take 4 mg by mouth every 6 hours as needed      busPIRone (BUSPAR) 15 MG tablet Take 15 mg by mouth 2 times daily      lithium (ESKALITH) 450 MG extended release tablet Take 450 mg by mouth 2 times daily      montelukast (SINGULAIR) 10 MG tablet Take 10 mg by mouth nightly      loratadine (CLARITIN) 10 MG capsule Take 10 mg by mouth daily      omeprazole (PRILOSEC) 20 MG capsule Take 40 mg by mouth in the morning and at bedtime        No current facility-administered medications for this visit. Allergies:    Allergies   Allergen Reactions    Penicillins Hives    Adhesive Tape Hives and Rash    Lidocaine Rash     Allergy is to the adhesive, not the lidocaine by pt report    Sulfa Antibiotics Hives and Rash     Social History:   Social History     Socioeconomic History    Marital status:      Spouse name: Not on file    Number of children: 11    Years of education: Not on file    Highest education level: Not on file   Occupational History    Occupation: nursing asst ,    Tobacco Use    Smoking status: Current Every Day Smoker     Packs/day: 0.50     Years: 30.00     Pack years: 15.00     Types: Cigarettes    Smokeless tobacco: Never Used   Vaping Use    Vaping Use: Never used   Substance and Sexual Activity    Alcohol use: No    Drug use: No    Sexual activity: Not Currently     Partners: Male   Other Topics Concern    Not on file   Social History Narrative    Not on file     Social Determinants of Health     Financial Resource Strain:     Difficulty of Paying Living Expenses: Not on file   Food Insecurity:     Worried About Running Out of Food in the Last Year: Not on file    Neetu of Food in the Last Year: Not on file   Transportation Needs:     Lack of Transportation (Medical): Not on file    Lack of Transportation (Non-Medical): Not on file   Physical Activity:     Days of Exercise per Week: Not on file    Minutes of Exercise per Session: Not on file   Stress:     Feeling of Stress : Not on file   Social Connections:     Frequency of Communication with Friends and Family: Not on file    Frequency of Social Gatherings with Friends and Family: Not on file    Attends Holiness Services: Not on file    Active Member of [address] [place] [address] [employer] or Organizations: Not on file    Attends Club or Organization Meetings: Not on file    Marital Status: Not on file   Intimate Partner Violence:     Fear of Current or Ex-Partner: Not on file    Emotionally Abused: Not on file    Physically Abused: Not on file    Sexually Abused: Not on file   Housing Stability:     Unable to Pay for Housing in the Last Year: Not on file    Number of [place] in the Last Year: Not on file    Unstable Housing in the Last Year: Not on file     Family History:   Family History   Problem Relation Age of Onset    Cancer Mother     Cancer Father          Objective:     Vitals  Vitals:    05/09/22 1244   BP: 121/77   Pulse: 91   Resp: 16   Temp: 98.9 °F (37.2 °C)   SpO2: 98%     Physical Exam  Physical Exam  Vitals and nursing note reviewed. Constitutional:       Appearance: Normal appearance. HENT:      Head: Normocephalic. Eyes:      Conjunctiva/sclera: Conjunctivae normal.   Cardiovascular:      Rate and Rhythm: Normal rate. Pulmonary:      Effort: Pulmonary effort is normal.   Musculoskeletal:         General: Normal range of motion. Cervical back: Normal range of motion. Skin:     General: Skin is warm and dry. Neurological:      General: No focal deficit present. Mental Status: She is alert.    Psychiatric:         Mood and Affect: Mood normal.         Behavior: Behavior normal.       All surgical incisions healing well. No erythema. No evidence of hematoma or abscess. Bandage removed from right side sling incision on mons. Healed well, no drainage or erythema. Results for POC orders placed in visit on 05/09/22   POCT Urinalysis no Micro   Result Value Ref Range    Color, UA yellow     Clarity, UA cloudy     Glucose, UA POC neg     Bilirubin, UA neg     Ketones, UA neg     Spec Grav, UA 1.010     Blood, UA POC pos     pH, UA 6.5     Protein, UA POC neg     Urobilinogen, UA neg     Leukocytes, UA pos     Nitrite, UA pos        Assessnent/Plan:     Jing Aponte is a 45 y.o. female with   1. Postop check    2. Urinary urgency    3. Acute UTI        Patient is doing well 1 week. .   -acute uti:  Will take ibuprofen for discomfort. Begin treatment with Macrobid while awaiting culture. Restrictions reviewed  Passed voiding trial.  Patient is to follow up in 5 weeks. .     Orders Placed This Encounter   Procedures    POCT Urinalysis no Micro    [place] INSERT,NON-INDWELLING BLADDER CATHETER     Orders Placed This Encounter   Medications    nitrofurantoin, macrocrystal-monohydrate, (MACROBID) 100 MG capsule     Sig: Take 1 capsule by mouth 2 times daily for 7 days     Dispense:  14 capsule     Refill:  0       JESUS ALBERTO Pa - CNP

## 2022-05-23 ENCOUNTER — TELEPHONE (OUTPATIENT)
Dept: UROGYNECOLOGY | Age: 52
End: 2022-05-23

## 2022-05-23 NOTE — TELEPHONE ENCOUNTER
Pt reports having photos taken of her surgical area, and she is worried because it looks like \"something from [employer]". She reports that she also has a UTI. I asked her to come into the office today to see scott, and she declined. She will be seen tmrw.

## 2022-05-23 NOTE — TELEPHONE ENCOUNTER
Pt called and said she is having burning and pain.  Rod Alfonso she thinks she still has infection    [phone]

## 2022-05-24 ENCOUNTER — OFFICE VISIT (OUTPATIENT)
Dept: UROGYNECOLOGY | Age: 52
End: 2022-05-24
Payer: COMMERCIAL

## 2022-05-24 VITALS
SYSTOLIC BLOOD PRESSURE: 137 MMHG | RESPIRATION RATE: 14 BRPM | HEART RATE: 98 BPM | DIASTOLIC BLOOD PRESSURE: 89 MMHG | OXYGEN SATURATION: 98 % | TEMPERATURE: 98 F

## 2022-05-24 DIAGNOSIS — K59.09 CHRONIC CONSTIPATION: ICD-10-CM

## 2022-05-24 DIAGNOSIS — R35.0 URINARY FREQUENCY: ICD-10-CM

## 2022-05-24 DIAGNOSIS — R39.15 URINARY URGENCY: ICD-10-CM

## 2022-05-24 DIAGNOSIS — Z09 POSTOP CHECK: Primary | ICD-10-CM

## 2022-05-24 DIAGNOSIS — Z87.440 HISTORY OF UTI: ICD-10-CM

## 2022-05-24 PROCEDURE — 99024 POSTOP FOLLOW-UP VISIT: CPT | Performed by: NURSE PRACTITIONER

## 2022-05-24 PROCEDURE — 81002 URINALYSIS NONAUTO W/O SCOPE: CPT | Performed by: NURSE PRACTITIONER

## 2022-05-24 NOTE — PROGRESS NOTES
5/24/2022       HPI:     Name: Trenia Gottron  YOB: 1970    CC: Trenia Gottron is a 45 y.o. female who is here for a post op evaluation. HPI: Recently underwent   VAGINAL VAULT SUSPENSION, POSTERIOR REPAIR, RETROPUBIC SLING and CYSTOSCOPY on 5/2/22. Reports not taking her colace, and being constipated for 4-5 days. She also did not finish her antibiotic that was given for the UTI on 5/9. Reports taking a photo of her bottom, and something coming out of her rectum.        Voiding difficulty: No  Initiating stream: No  Force stream: No  Lean forward to empty: No  Slow/intermittent stream: No  Unable to empty bladder: No  Incontinence: no   Urgency without incontinence: No   Frequency without incontinence: No   Bowel functions: constipation   Pain Controlled: Yes    Past Medical History:   Past Medical History:   Diagnosis Date    Apnea, sleep     patient stated she does not use cpap    Arthritis     Solares's esophageal ulceration     B[facility])     COPD (chronic obstructive pulmonary disease) ([facility])     Depression     GERD (gastroesophageal reflux disease)      Past Surgical History:   Past Surgical History:   Procedure Laterality Date    COLONOSCOPY      CYSTOSCOPY N/A 5/2/2022    CYSTOSCOPY performed by Stella Sarah MD at [address] [facility], COLON, DIAGNOSTIC      HYSTERECTOMY      JOINT REPLACEMENT Bilateral     knee replacement    TONSILLECTOMY      TUBAL LIGATION      URETHRAL SURGERY N/A 5/2/2022    RETROPUBIC SLING performed by Stella Sarah MD at [facility] 5/2/2022    VAGINAL VAULT SUSPENSION performed by Stella Sarah MD at [facility] 5/2/2022    POSTERIOR REPAIR performed by Stella Sarah MD at [place] [address]     Current Medications:  Current Outpatient Medications   Medication Sig Dispense Refill    Psyllium (METAMUCIL) 48.57 % POWD Take 1 Scoop by mouth 2 times daily 538 g 1    docusate sodium (COLACE) 100 MG capsule Take 1 capsule by mouth 2 times daily 60 capsule 0    nortriptyline (PAMELOR) 25 MG capsule Take 50 mg by mouth nightly      traZODone (DESYREL) 50 MG tablet Take 50 mg by mouth nightly as needed for Sleep      albuterol sulfate  (90 Base) MCG/ACT inhaler Inhale 2 puffs into the lungs every 6 hours as needed      fluticasone (FLONASE) 50 MCG/ACT nasal spray 2 sprays by Nasal route as needed       busPIRone (BUSPAR) 15 MG tablet Take 15 mg by mouth 2 times daily      lithium (ESKALITH) 450 MG extended release tablet Take 450 mg by mouth 2 times daily      montelukast (SINGULAIR) 10 MG tablet Take 10 mg by mouth nightly      loratadine (CLARITIN) 10 MG capsule Take 10 mg by mouth daily      omeprazole (PRILOSEC) 20 MG capsule Take 40 mg by mouth in the morning and at bedtime       ibuprofen (ADVIL;MOTRIN) 600 MG tablet Take 1 tablet by mouth three times daily for 7 days 30 tablet 1    ondansetron (ZOFRAN-ODT) 4 MG disintegrating tablet Take 4 mg by mouth every 6 hours as needed (Patient not taking: Reported on 5/24/2022)       No current facility-administered medications for this visit. Allergies:    Allergies   Allergen Reactions    Penicillins Hives    Adhesive Tape Hives and Rash    Lidocaine Rash     Allergy is to the adhesive, not the lidocaine by pt report    Sulfa Antibiotics Hives and Rash     Social History:   Social History     Socioeconomic History    Marital status:      Spouse name: Not on file    Number of children: 11    Years of education: Not on file    Highest education level: Not on file   Occupational History    Occupation: nursing asst ,    Tobacco Use    Smoking status: Current Every Day Smoker     Packs/day: 0.50     Years: 30.00     Pack years: 15.00     Types: Cigarettes    Smokeless tobacco: Never Used   Vaping Use    Vaping Use: Never used   Substance and Sexual Activity    Alcohol use: No    Drug use: No    Sexual activity: Not Currently Partners: Male   Other Topics Concern    Not on file   Social History Narrative    Not on file     Social Determinants of Health     Financial Resource Strain:     Difficulty of Paying Living Expenses: Not on file   Food Insecurity:     Worried About Running Out of Food in the Last Year: Not on file    Neetu of Food in the Last Year: Not on file   Transportation Needs:     Lack of Transportation (Medical): Not on file    Lack of Transportation (Non-Medical): Not on file   Physical Activity:     Days of Exercise per Week: Not on file    Minutes of Exercise per Session: Not on file   Stress:     Feeling of Stress : Not on file   Social Connections:     Frequency of Communication with Friends and Family: Not on file    Frequency of Social Gatherings with Friends and Family: Not on file    Attends Anabaptist Services: Not on file    Active Member of [address] [place] [address] [employer] or Organizations: Not on file    Attends Club or Organization Meetings: Not on file    Marital Status: Not on file   Intimate Partner Violence:     Fear of Current or Ex-Partner: Not on file    Emotionally Abused: Not on file    Physically Abused: Not on file    Sexually Abused: Not on file   Housing Stability:     Unable to Pay for Housing in the Last Year: Not on file    Number of [place] in the Last Year: Not on file    Unstable Housing in the Last Year: Not on file     Family History:   Family History   Problem Relation Age of Onset    Cancer Mother     Cancer Father          Objective:     Vitals  Vitals:    05/24/22 1316 05/24/22 1317   BP: (!) 168/97 137/89   Pulse: 98    Resp: 14    Temp: 98 °F (36.7 °C)    SpO2: 98%      Physical Exam  Physical Exam  Vitals and nursing note reviewed. Constitutional:       Appearance: Normal appearance. HENT:      Head: Normocephalic. Eyes:      Conjunctiva/sclera: Conjunctivae normal.   Cardiovascular:      Rate and Rhythm: Normal rate.    Pulmonary:      Effort: Pulmonary effort is normal.   Musculoskeletal:         General: Normal range of motion. Cervical back: Normal range of motion. Skin:     General: Skin is warm and dry. Neurological:      General: No focal deficit present. Mental Status: She is alert. Psychiatric:         Mood and Affect: Mood normal.         Behavior: Behavior normal.       All surgical incisions healing well. No erythema. No evidence of hematoma or abscess. Well supported    Straight urinary catheterization performed by sterile procedure for urine specimen per Gene Talbot NP.  250ml post void. Patient tolerated well. Results for POC orders placed in visit on 05/24/22   POCT Urinalysis no Micro   Result Value Ref Range    Color, UA yellow     Clarity, UA clear     Glucose, UA POC neg     Bilirubin, UA neg     Ketones, UA neg     Spec Grav, UA 1.015     Blood, UA POC neg     pH, UA 6.5     Protein, UA POC neg     Urobilinogen, UA neg     Leukocytes, UA neg     Nitrite, UA neg        Assessnent/Plan:     Chloe Victoria is a 45 y.o. female with   1. Postop check    2. Urinary urgency    3. Urinary frequency    4. History of UTI        Patient is  3 weeks.  -Chronic constipation:  Reviewed instructions for her bowel medications as she has not been taking. Advised of importance  -Hx UTI:  Urine sent for culture. Advised of importance of completing all antibiotic if positive again  Reassured of normal exam  Recheck PVR at 6 week post op after resolution of constipation and UTI   Patient is to follow up as scheduled  JESUS ALBERTO Carney CNP      Orders Placed This Encounter   Procedures    Culture, Urine     Order Specific Question:   Specify (ex-cath, midstream, cysto, etc)? Answer:   straight cath    Insert walter catheter    POCT Urinalysis no Micro     No orders of the defined types were placed in this encounter.       JESUS ALBERTO Carney CNP

## 2022-05-27 LAB — URINE CULTURE, ROUTINE: NORMAL

## 2022-06-29 ENCOUNTER — OFFICE VISIT (OUTPATIENT)
Dept: UROGYNECOLOGY | Age: 52
End: 2022-06-29
Payer: COMMERCIAL

## 2022-06-29 VITALS
RESPIRATION RATE: 16 BRPM | DIASTOLIC BLOOD PRESSURE: 74 MMHG | SYSTOLIC BLOOD PRESSURE: 111 MMHG | HEART RATE: 64 BPM | OXYGEN SATURATION: 99 % | TEMPERATURE: 98 F

## 2022-06-29 DIAGNOSIS — R39.15 URINARY URGENCY: Primary | ICD-10-CM

## 2022-06-29 DIAGNOSIS — R35.0 URINARY FREQUENCY: ICD-10-CM

## 2022-06-29 PROCEDURE — 51701 INSERT BLADDER CATHETER: CPT | Performed by: NURSE PRACTITIONER

## 2022-06-29 PROCEDURE — 99024 POSTOP FOLLOW-UP VISIT: CPT | Performed by: NURSE PRACTITIONER

## 2022-06-29 PROCEDURE — 81002 URINALYSIS NONAUTO W/O SCOPE: CPT | Performed by: NURSE PRACTITIONER

## 2022-06-29 RX ORDER — PROMETHAZINE HYDROCHLORIDE 12.5 MG/1
TABLET ORAL
COMMUNITY
Start: 2022-06-09

## 2022-06-29 RX ORDER — KETOCONAZOLE 20 MG/G
CREAM TOPICAL
COMMUNITY
Start: 2022-06-09

## 2022-06-29 RX ORDER — OMEPRAZOLE 40 MG/1
CAPSULE, DELAYED RELEASE ORAL
COMMUNITY
Start: 2022-06-01

## 2022-06-29 NOTE — PROGRESS NOTES
6/29/2022     HPI:     Name: Katharina Velazco  YOB: 1970    CC: Katharina Velazco is a 45 y.o. female who is here for a post op evaluation. HPI: Recently underwent VAGINAL VAULT SUSPENSION, POSTERIOR REPAIR, RETROPUBIC SLING and CYSTOSCOPY on 5/2/22.     Voiding difficulty: No  Initiating stream: No  Force stream: No  Lean forward to empty: No  Slow/intermittent stream: No  Unable to empty bladder: No  Incontinence: no   Urgency without incontinence: No   Frequency without incontinence: No   Bowel functions: normal   Pain Controlled: No, taking Ibuprofen and a few leftover pain pills    Patient states she feels like she has a UTI, noticing abdominal discomfort starting 2-3 days ago \"like when she had one before\"    Past Medical History:   Past Medical History:   Diagnosis Date    Apnea, sleep     patient stated she does not use cpap    Arthritis     Solares's esophageal ulceration     B[facility]     COPD [facility]chronic obstructive pulmonary disease) [facility]     Depression     GERD (gastroesophageal reflux disease)      Past Surgical History:   Past Surgical History:   Procedure Laterality Date    COLONOSCOPY      CYSTOSCOPY N/A 5/2/2022    CYSTOSCOPY performed by Tobias Dunham MD at [address] [place] [address], DIAGNOSTIC      HYSTERECTOMY (CERVIX STATUS UNKNOWN)      JOINT REPLACEMENT Bilateral     knee replacement    TONSILLECTOMY      TUBAL LIGATION      URETHRAL SURGERY N/A 5/2/2022    RETROPUBIC SLING performed by Tobias Dunham MD at [address] [facility] 5/2/2022    VAGINAL VAULT SUSPENSION performed by Tobias Dunham MD at [facility] 5/2/2022    POSTERIOR REPAIR performed by Tobias Dunham MD at [place] [address]     Current Medications:  Current Outpatient Medications   Medication Sig Dispense Refill    ketoconazole (NIZORAL) 2 % cream       omeprazole (PRILOSEC) 40 MG delayed release capsule TAKE ONE (1) CAPSULE BY MOUTH TWICE DAILY      promethazine (PHENERGAN) 12.5 MG tablet       nortriptyline (PAMELOR) 25 MG capsule Take 50 mg by mouth nightly      traZODone (DESYREL) 50 MG tablet Take 50 mg by mouth nightly as needed for Sleep      albuterol sulfate  (90 Base) MCG/ACT inhaler Inhale 2 puffs into the lungs every 6 hours as needed      fluticasone (FLONASE) 50 MCG/ACT nasal spray 2 sprays by Nasal route as needed       ondansetron (ZOFRAN-ODT) 4 MG disintegrating tablet Take 4 mg by mouth every 6 hours as needed       busPIRone (BUSPAR) 15 MG tablet Take 15 mg by mouth 2 times daily      lithium (ESKALITH) 450 MG extended release tablet Take 450 mg by mouth 2 times daily      montelukast (SINGULAIR) 10 MG tablet Take 10 mg by mouth nightly      loratadine (CLARITIN) 10 MG capsule Take 10 mg by mouth daily      omeprazole (PRILOSEC) 20 MG capsule Take 40 mg by mouth in the morning and at bedtime       ibuprofen (ADVIL;MOTRIN) 600 MG tablet Take 1 tablet by mouth three times daily for 7 days 30 tablet 1     No current facility-administered medications for this visit. Allergies:    Allergies   Allergen Reactions    Penicillins Hives    Adhesive Tape Hives and Rash    Lidocaine Rash     Allergy is to the adhesive, not the lidocaine by pt report    Sulfa Antibiotics Hives and Rash     Social History:   Social History     Socioeconomic History    Marital status:      Spouse name: Not on file    Number of children: 11    Years of education: Not on file    Highest education level: Not on file   Occupational History    Occupation: nursing asst ,    Tobacco Use    Smoking status: Current Every Day Smoker     Packs/day: 0.50     Years: 30.00     Pack years: 15.00     Types: Cigarettes    Smokeless tobacco: Never Used   Vaping Use    Vaping Use: Never used   Substance and Sexual Activity    Alcohol use: No    Drug use: No    Sexual activity: Not Currently     Partners: Male   Other Topics Concern    Not on file Social History Narrative    Not on file     Social Determinants of Health     Financial Resource Strain:     Difficulty of Paying Living Expenses: Not on file   Food Insecurity:     Worried About Running Out of Food in the Last Year: Not on file    Neetu of Food in the Last Year: Not on file   Transportation Needs:     Lack of Transportation (Medical): Not on file    Lack of Transportation (Non-Medical): Not on file   Physical Activity:     Days of Exercise per Week: Not on file    Minutes of Exercise per Session: Not on file   Stress:     Feeling of Stress : Not on file   Social Connections:     Frequency of Communication with Friends and Family: Not on file    Frequency of Social Gatherings with Friends and Family: Not on file    Attends Judaism Services: Not on file    Active Member of [address] [place] [address] [employer] or Organizations: Not on file    Attends Club or Organization Meetings: Not on file    Marital Status: Not on file   Intimate Partner Violence:     Fear of Current or Ex-Partner: Not on file    Emotionally Abused: Not on file    Physically Abused: Not on file    Sexually Abused: Not on file   Housing Stability:     Unable to Pay for Housing in the Last Year: Not on file    Number of [place] in the Last Year: Not on file    Unstable Housing in the Last Year: Not on file     Family History:   Family History   Problem Relation Age of Onset    Cancer Mother     Cancer Father          Objective:     Vitals  Vitals:    06/29/22 1204 06/29/22 1205   BP: (!) 153/86 111/74   Pulse: 64    Resp: 16    Temp: 98 °F (36.7 °C)    SpO2: 99%      Physical Exam  Physical Exam  Vitals and nursing note reviewed. Constitutional:       Appearance: Normal appearance. HENT:      Head: Normocephalic. Eyes:      Conjunctiva/sclera: Conjunctivae normal.   Cardiovascular:      Rate and Rhythm: Normal rate.    Pulmonary:      Effort: Pulmonary effort is normal.   Musculoskeletal:         General: Normal range of

## 2022-06-30 LAB — URINE CULTURE, ROUTINE: NORMAL

## 2022-07-01 NOTE — RESULT ENCOUNTER NOTE
Called patient an notified her of normal urine culture results, she was thankful for call and had no questions at this time.

## (undated) DEVICE — SUTURE VCRL + SZ 4-0 L27IN ABSRB UD PS-2 3/8 CIR REV CUT VCP426H

## (undated) DEVICE — SUTURE VCRL + SZ 2-0 L18IN ABSRB VLT L26MM SH 1/2 CIR VCP775D

## (undated) DEVICE — SKIN MARKER REGULAR TIP WITH RULER CAP AND LABELS: Brand: DEVON

## (undated) DEVICE — CYSTOSCOPY: Brand: MEDLINE INDUSTRIES, INC.

## (undated) DEVICE — SUTURE VCRL + SZ 3-0 L27IN ABSRB UD L26MM SH 1/2 CIR VCP416H

## (undated) DEVICE — Z DISCONTINUED BY MEDLINE USE 2711682 TRAY SKIN PREP DRY W/ PREM GLV

## (undated) DEVICE — 3M™ STERI-STRIP™ COMPOUND BENZOIN TINCTURE 40 BAGS/CARTON 4 CARTONS/CASE C1544: Brand: 3M™ STERI-STRIP™

## (undated) DEVICE — SOLUTION IV IRRIG POUR BRL 0.9% SODIUM CHL 2F7124

## (undated) DEVICE — CYSTO/BLADDER IRRIGATION SET, REGULATING CLAMP

## (undated) DEVICE — MERCY HEALTH WEST TURNOVER: Brand: MEDLINE INDUSTRIES, INC.

## (undated) DEVICE — STRIP,CLOSURE,WOUND,MEDI-STRIP,1/4X3: Brand: MEDLINE

## (undated) DEVICE — SOLUTION IRRIGATION STRL H2O 1000 ML UROMATIC CONTAINER

## (undated) DEVICE — TUBING, SUCTION, 1/4" X 12', STRAIGHT: Brand: MEDLINE

## (undated) DEVICE — MAJOR SET UP: Brand: MEDLINE INDUSTRIES, INC.

## (undated) DEVICE — HYPODERMIC SAFETY NEEDLE: Brand: MAGELLAN

## (undated) DEVICE — SYRINGE MED 10ML LUERLOCK TIP W/O SFTY DISP

## (undated) DEVICE — CATH URETH 24FR DOVER RED LTX

## (undated) DEVICE — GOWN SIRUS NONREIN XL W/TWL: Brand: MEDLINE INDUSTRIES, INC.

## (undated) DEVICE — SUTURE VCRL + SZ 4-0 L27IN ABSRB WHT FS-2 3/8 CIR REV CUT VCP422H

## (undated) DEVICE — SYRINGE MED 10ML SLIP TIP BLNT FILL AND LUERLOCK DISP

## (undated) DEVICE — SUTURE VCRL + SZ 0 L27IN ABSRB WHT CT-2 1/2 CIR TAPERPOINT VCP270H

## (undated) DEVICE — BASIC SINGLE BASIN 1-LF: Brand: MEDLINE INDUSTRIES, INC.

## (undated) DEVICE — BAG DRAINAGE 2 LT STRL ANRFLX LF

## (undated) DEVICE — LEGGINGS, PAIR, CLEAR, STERILE: Brand: MEDLINE

## (undated) DEVICE — DRAINBAG,ANTI-REFLUX TOWER,L/F,2000ML,LL: Brand: MEDLINE

## (undated) DEVICE — PAD,ABDOMINAL,8"X7.5",STERILE,LF,1/PK: Brand: MEDLINE

## (undated) DEVICE — GAUZE,PACKING STRIP,IODOFORM,2"X5YD,STRL: Brand: CURAD

## (undated) DEVICE — HOOK RETRCT L5MM E SHRP SELF RET SYS LONE STAR

## (undated) DEVICE — SPONGE LAPAROTOMY W4XL18IN WHT STRUNG RADPQ PREWASHED ST

## (undated) DEVICE — GAUZE,PACKING STRIP,PLAIN,2"X5YD,STRL,LF: Brand: CURAD

## (undated) DEVICE — RETRACTOR SURG W18.3XL32.5CM PLAS SELF RET W/ 2 CATH CLP

## (undated) DEVICE — SOLUTION SCRB 4OZ 10% POVIDONE IOD ANTIMIC BTL

## (undated) DEVICE — SYRINGE MED 10ML TRNSLUC BRL PLUNG BLK MRK POLYPR CTRL

## (undated) DEVICE — GAUZE,SPONGE,4"X4",16PLY,XRAY,STRL,LF: Brand: MEDLINE

## (undated) DEVICE — DRAPE LAP 104X35X124IN BLU CTRL + FAB REINF ABD FEN

## (undated) DEVICE — GLOVE SURG SZ 7.5 L11.73IN FNGR THK9.8MIL STRW LTX POLYMER

## (undated) DEVICE — SUTURE VCRL + SZ 2-0 L18IN ABSRB CLR TIE POLYGLACTIN 910 VCP111G

## (undated) DEVICE — SOLUTION IV IRRIG WATER 1000ML POUR BRL 2F7114

## (undated) DEVICE — SOLUTION PREP POVIDONE IOD FOR SKIN MUCOUS MEM PRIOR TO

## (undated) DEVICE — CATHETER F BLLN 5CC 16FR 2 W HYDRGEL COAT LESS TRAUM LUB

## (undated) DEVICE — 1016 S-DRAPE IRRIG POUCH 10/BOX: Brand: STERI-DRAPE™

## (undated) DEVICE — SUTURE PROL SZ 2-0 L30IN NONABSORBABLE BLU L26MM SH 1/2 CIR 8833H

## (undated) DEVICE — CATHETER URETH 18FR 2CC BLLN SIL ELASTMR F 2 W BARDX

## (undated) DEVICE — SOLUTION IRRIG 3000ML STRL H2O USP UROMATIC PLAS CONT

## (undated) DEVICE — TAPE DRESSING 36YDX.25IN COTTON TWL

## (undated) DEVICE — Y-TYPE TUR/BLADDER IRRIGATION SET, REGULATING CLAMP

## (undated) DEVICE — BANDAGE GZ W6INXL162IN UNIV NONADHESIVE WND GEN USE KERLIX

## (undated) DEVICE — SYRINGE 20ML LL S/C 50

## (undated) DEVICE — SUTURE VCRL + SZ 0 L18IN ABSRB VLT L26MM CT-2 1/2 CIR VCP727D

## (undated) DEVICE — NEEDLE HYPO 23GA L1.5IN TURQ POLYPR HUB S STL THN WALL IM

## (undated) DEVICE — NEEDLE ENDOSCP 1ML SYR CA HA SIDEKCK RIG INJ COAPTITE

## (undated) DEVICE — NEEDLE SPNL 22GA L2.5IN BLK QNCKE BVL DISP

## (undated) DEVICE — 3M™ STERI-DRAPE™ INCISE DRAPE 1050 (60CM X 45CM): Brand: STERI-DRAPE™

## (undated) DEVICE — DRAPE,UNDERBUTTOCKS,PCH,STERILE: Brand: MEDLINE

## (undated) DEVICE — ELECTRODE PT RET AD L9FT HI MOIST COND ADH HYDRGEL CORDED

## (undated) DEVICE — SPONGE,LAP,4"X18",XR,ST,5/PK,40PK/CS: Brand: MEDLINE INDUSTRIES, INC.